# Patient Record
Sex: MALE | Race: WHITE | Employment: OTHER | URBAN - METROPOLITAN AREA
[De-identification: names, ages, dates, MRNs, and addresses within clinical notes are randomized per-mention and may not be internally consistent; named-entity substitution may affect disease eponyms.]

---

## 2022-05-23 ENCOUNTER — TELEPHONE (OUTPATIENT)
Dept: ENT CLINIC | Age: 82
End: 2022-05-23

## 2022-05-25 ENCOUNTER — TELEPHONE (OUTPATIENT)
Dept: ENT CLINIC | Age: 82
End: 2022-05-25

## 2022-05-25 NOTE — TELEPHONE ENCOUNTER
Attempted to call the pt to r/s their next appt due to Dr. Jaqueline Mendoza being in surgery. Unable to reach the pt so lvm stating that this appt was cancelled and to call the office to r/s the appt.

## 2023-02-28 ENCOUNTER — OFFICE VISIT (OUTPATIENT)
Dept: ENT CLINIC | Age: 83
End: 2023-02-28
Payer: MEDICARE

## 2023-02-28 VITALS
HEIGHT: 71 IN | RESPIRATION RATE: 16 BRPM | WEIGHT: 225 LBS | SYSTOLIC BLOOD PRESSURE: 134 MMHG | DIASTOLIC BLOOD PRESSURE: 82 MMHG | BODY MASS INDEX: 31.5 KG/M2 | HEART RATE: 95 BPM | OXYGEN SATURATION: 96 %

## 2023-02-28 DIAGNOSIS — J31.0 CHRONIC RHINITIS: ICD-10-CM

## 2023-02-28 DIAGNOSIS — H72.92 PERFORATION OF LEFT TYMPANIC MEMBRANE: ICD-10-CM

## 2023-02-28 DIAGNOSIS — H61.23 BILATERAL IMPACTED CERUMEN: ICD-10-CM

## 2023-02-28 DIAGNOSIS — H90.3 SENSORINEURAL HEARING LOSS (SNHL) OF BOTH EARS: Primary | ICD-10-CM

## 2023-02-28 PROCEDURE — 69210 REMOVE IMPACTED EAR WAX UNI: CPT | Performed by: OTOLARYNGOLOGY

## 2023-02-28 PROCEDURE — 1123F ACP DISCUSS/DSCN MKR DOCD: CPT | Performed by: OTOLARYNGOLOGY

## 2023-02-28 PROCEDURE — G8417 CALC BMI ABV UP PARAM F/U: HCPCS | Performed by: OTOLARYNGOLOGY

## 2023-02-28 PROCEDURE — 99203 OFFICE O/P NEW LOW 30 MIN: CPT | Performed by: OTOLARYNGOLOGY

## 2023-02-28 PROCEDURE — 1101F PT FALLS ASSESS-DOCD LE1/YR: CPT | Performed by: OTOLARYNGOLOGY

## 2023-02-28 PROCEDURE — G8427 DOCREV CUR MEDS BY ELIG CLIN: HCPCS | Performed by: OTOLARYNGOLOGY

## 2023-02-28 PROCEDURE — G8536 NO DOC ELDER MAL SCRN: HCPCS | Performed by: OTOLARYNGOLOGY

## 2023-02-28 PROCEDURE — G8510 SCR DEP NEG, NO PLAN REQD: HCPCS | Performed by: OTOLARYNGOLOGY

## 2023-02-28 RX ORDER — TAMSULOSIN HYDROCHLORIDE 0.4 MG/1
CAPSULE ORAL
COMMUNITY
Start: 2023-02-08

## 2023-02-28 RX ORDER — NEOMYCIN SULFATE, POLYMYXIN B SULFATE AND HYDROCORTISONE 10; 3.5; 1 MG/ML; MG/ML; [USP'U]/ML
3 SUSPENSION/ DROPS AURICULAR (OTIC) 4 TIMES DAILY
Qty: 10 ML | Refills: 1 | Status: SHIPPED | OUTPATIENT
Start: 2023-02-28

## 2023-02-28 RX ORDER — LOSARTAN POTASSIUM AND HYDROCHLOROTHIAZIDE 12.5; 1 MG/1; MG/1
TABLET ORAL
COMMUNITY
Start: 2023-02-08

## 2023-02-28 RX ORDER — ATORVASTATIN CALCIUM 20 MG/1
TABLET, FILM COATED ORAL
COMMUNITY
Start: 2023-02-08

## 2023-02-28 NOTE — LETTER
2/28/2023    Patient: Mustapha Garvin. YOB: 1940   Date of Visit: 2/28/2023     Jason Knight MD  [address] [place] [phone]  Via Fax: [phone]    Dear Jason Knight MD,      Thank you for referring Mr. Khadar Mayer to [facility] for evaluation. My notes for this consultation are attached. If you have questions, please do not hesitate to call me. I look forward to following your patient along with you.       Sincerely,    Mercy Andrews MD

## 2023-02-28 NOTE — PROGRESS NOTES
Subjective: Deborrhowie Amen.   80 y.o.   1940     New Patient Visit    History of Present Illness:    2/28/23  Pt seen in practice in past, was lost to followup - presents again for ear problems - known left TM perf, hx of tubes in past; also needs ears cleaned, hearing has been getting worse. Review of Systems  Review of Systems   Constitutional:  Negative for chills and fever. HENT:  Positive for congestion and hearing loss. Negative for ear pain, nosebleeds and tinnitus. Eyes:  Negative for blurred vision and double vision. Respiratory:  Negative for cough, sputum production and shortness of breath. Cardiovascular:  Negative for chest pain and palpitations. Gastrointestinal:  Negative for heartburn, nausea and vomiting. Musculoskeletal:  Negative for joint pain and neck pain. Skin: Negative. Neurological:  Negative for dizziness, speech change, weakness and headaches. Endo/Heme/Allergies:  Positive for environmental allergies. Does not bruise/bleed easily. Psychiatric/Behavioral:  Negative for memory loss. The patient does not have insomnia. Past Medical History:   Diagnosis Date    High cholesterol     HTN (hypertension)      Past Surgical History:   Procedure Laterality Date    HX AAA REPAIR      HX OTHER SURGICAL      open heart      History reviewed. No pertinent family history. Social History     Tobacco Use    Smoking status: Former     Types: Cigarettes    Smokeless tobacco: Never   Substance Use Topics    Alcohol use: Not on file      Prior to Admission medications    Medication Sig Start Date End Date Taking?  Authorizing Provider   atorvastatin (LIPITOR) 20 mg tablet  2/8/23  Yes Provider, Historical   losartan-hydroCHLOROthiazide (HYZAAR) 100-12.5 mg per tablet  2/8/23  Yes Provider, Historical   tamsulosin (FLOMAX) 0.4 mg capsule  2/8/23  Yes Provider, Historical        No Known Allergies      Objective:     Visit Vitals  /82 (BP 1 Location: Left upper arm, BP Patient Position: Sitting, BP Cuff Size: Adult)   Pulse 95   Resp 16   Ht 5' 11\" (1.803 m)   Wt 225 lb (102.1 kg)   SpO2 96%   BMI 31.38 kg/m²        Physical Exam  Vitals reviewed. Constitutional:       General: He is awake. Appearance: Normal appearance. He is normal weight. HENT:      Head: Normocephalic and atraumatic. Jaw: There is normal jaw occlusion. No trismus, tenderness or malocclusion. Salivary Glands: Right salivary gland is not diffusely enlarged or tender. Left salivary gland is not diffusely enlarged or tender. Right Ear: Tympanic membrane, ear canal and external ear normal. Decreased hearing noted. There is impacted cerumen. Left Ear: Ear canal and external ear normal. Decreased hearing noted. There is impacted cerumen. Tympanic membrane is perforated. Ears:        Comments: 40% ant/inf perforation left     Nose: No septal deviation, mucosal edema or rhinorrhea. Right Turbinates: Not enlarged, swollen or pale. Left Turbinates: Not enlarged, swollen or pale. Right Sinus: No maxillary sinus tenderness or frontal sinus tenderness. Left Sinus: No maxillary sinus tenderness or frontal sinus tenderness. Mouth/Throat:      Lips: Pink. Mouth: Mucous membranes are moist. No oral lesions. Dentition: Normal dentition. No gum lesions. Tongue: No lesions. Palate: No mass and lesions. Pharynx: Oropharynx is clear. Uvula midline. Tonsils: No tonsillar exudate. 0 on the right. 0 on the left. Eyes:      General: Vision grossly intact. Extraocular Movements: Extraocular movements intact. Right eye: No nystagmus. Left eye: No nystagmus. Pupils: Pupils are equal, round, and reactive to light. Neck:      Thyroid: No thyroid mass, thyromegaly or thyroid tenderness. Trachea: Trachea and phonation normal. No tracheal tenderness. Cardiovascular:      Rate and Rhythm: Normal rate and regular rhythm. Pulmonary:      Effort: Pulmonary effort is normal.      Breath sounds: Normal breath sounds. No stridor. No wheezing. Musculoskeletal:         General: Normal range of motion. Cervical back: Normal range of motion. No edema or erythema. Lymphadenopathy:      Cervical: No cervical adenopathy. Skin:     General: Skin is warm and dry. Neurological:      General: No focal deficit present. Mental Status: He is alert and oriented to person, place, and time. Mental status is at baseline. Coordination: Romberg sign negative. Gait: Gait is intact. Psychiatric:         Mood and Affect: Mood normal.         Behavior: Behavior normal. Behavior is cooperative. Procedure - Removal Impacted Cerumen    Indications: cerumen impaction    Ears are examined under microscope/headlight.  bilateral ears are cleaned using otologic curette, suction, and/or alligator forceps. Assessment/Plan:     Encounter Diagnoses   Name Primary? Sensorineural hearing loss (SNHL) of both ears Yes    Perforation of left tympanic membrane     Chronic rhinitis     Bilateral impacted cerumen      Ears cleaned AU  Right TM retracted  Left TM dry perforation  Will refill neomycin gtts only use if otorrhea  Fu 6 mos for cleaning    Orders Placed This Encounter    atorvastatin (LIPITOR) 20 mg tablet    losartan-hydroCHLOROthiazide (HYZAAR) 100-12.5 mg per tablet    tamsulosin (FLOMAX) 0.4 mg capsule           Thank you for referring this patient,    Nestor Angela MD, [address] [facility]    [address]  [facility]    [address] [address]  [place], [address] [place] [address]

## 2023-05-25 RX ORDER — TAMSULOSIN HYDROCHLORIDE 0.4 MG/1
CAPSULE ORAL
COMMUNITY
Start: 2023-02-08

## 2023-05-25 RX ORDER — ATORVASTATIN CALCIUM 20 MG/1
TABLET, FILM COATED ORAL
COMMUNITY
Start: 2023-02-08

## 2023-05-25 RX ORDER — LOSARTAN POTASSIUM AND HYDROCHLOROTHIAZIDE 12.5; 1 MG/1; MG/1
TABLET ORAL
COMMUNITY
Start: 2023-02-08

## 2023-05-25 RX ORDER — NEOMYCIN SULFATE, POLYMYXIN B SULFATE AND HYDROCORTISONE 10; 3.5; 1 MG/ML; MG/ML; [USP'U]/ML
3 SUSPENSION/ DROPS AURICULAR (OTIC) 4 TIMES DAILY
COMMUNITY
Start: 2023-02-28

## 2023-10-10 ENCOUNTER — OFFICE VISIT (OUTPATIENT)
Age: 83
End: 2023-10-10
Payer: MEDICARE

## 2023-10-10 VITALS
OXYGEN SATURATION: 95 % | DIASTOLIC BLOOD PRESSURE: 74 MMHG | HEART RATE: 114 BPM | BODY MASS INDEX: 32.62 KG/M2 | HEIGHT: 71 IN | SYSTOLIC BLOOD PRESSURE: 136 MMHG | RESPIRATION RATE: 16 BRPM | WEIGHT: 233 LBS

## 2023-10-10 DIAGNOSIS — H73.891 RETRACTED TYMPANIC MEMBRANE, RIGHT: ICD-10-CM

## 2023-10-10 DIAGNOSIS — H91.93 DECREASED HEARING, BILATERAL: ICD-10-CM

## 2023-10-10 DIAGNOSIS — H69.83 ETD (EUSTACHIAN TUBE DYSFUNCTION), BILATERAL: ICD-10-CM

## 2023-10-10 DIAGNOSIS — H72.92 PERFORATION OF LEFT TYMPANIC MEMBRANE: Primary | ICD-10-CM

## 2023-10-10 PROCEDURE — G8484 FLU IMMUNIZE NO ADMIN: HCPCS | Performed by: OTOLARYNGOLOGY

## 2023-10-10 PROCEDURE — 1036F TOBACCO NON-USER: CPT | Performed by: OTOLARYNGOLOGY

## 2023-10-10 PROCEDURE — 99213 OFFICE O/P EST LOW 20 MIN: CPT | Performed by: OTOLARYNGOLOGY

## 2023-10-10 PROCEDURE — G8427 DOCREV CUR MEDS BY ELIG CLIN: HCPCS | Performed by: OTOLARYNGOLOGY

## 2023-10-10 PROCEDURE — 1123F ACP DISCUSS/DSCN MKR DOCD: CPT | Performed by: OTOLARYNGOLOGY

## 2023-10-10 PROCEDURE — G8419 CALC BMI OUT NRM PARAM NOF/U: HCPCS | Performed by: OTOLARYNGOLOGY

## 2023-10-10 RX ORDER — ALBUTEROL SULFATE 90 UG/1
AEROSOL, METERED RESPIRATORY (INHALATION)
COMMUNITY
Start: 2023-08-02

## 2025-01-16 ENCOUNTER — HOSPITAL ENCOUNTER (OUTPATIENT)
Facility: HOSPITAL | Age: 85
Discharge: HOME | End: 2025-01-19
Payer: MEDICARE

## 2025-01-16 ENCOUNTER — TRANSCRIBE ORDERS (OUTPATIENT)
Facility: HOSPITAL | Age: 85
End: 2025-01-16

## 2025-01-16 DIAGNOSIS — R05.9 COUGH, UNSPECIFIED TYPE: Primary | ICD-10-CM

## 2025-01-16 DIAGNOSIS — R05.9 COUGH, UNSPECIFIED TYPE: ICD-10-CM

## 2025-01-16 PROCEDURE — 71046 X-RAY EXAM CHEST 2 VIEWS: CPT

## 2025-02-14 ENCOUNTER — TRANSCRIBE ORDERS (OUTPATIENT)
Facility: HOSPITAL | Age: 85
End: 2025-02-14

## 2025-02-14 DIAGNOSIS — G89.29 CHRONIC LOW BACK PAIN, UNSPECIFIED BACK PAIN LATERALITY, UNSPECIFIED WHETHER SCIATICA PRESENT: Primary | ICD-10-CM

## 2025-02-14 DIAGNOSIS — M54.50 CHRONIC LOW BACK PAIN, UNSPECIFIED BACK PAIN LATERALITY, UNSPECIFIED WHETHER SCIATICA PRESENT: Primary | ICD-10-CM

## 2025-03-18 ENCOUNTER — HOSPITAL ENCOUNTER (INPATIENT)
Facility: HOSPITAL | Age: 85
LOS: 5 days | Discharge: HOME HEALTH | DRG: 291 | End: 2025-03-23
Attending: EMERGENCY MEDICINE
Payer: MEDICARE

## 2025-03-18 ENCOUNTER — APPOINTMENT (OUTPATIENT)
Facility: HOSPITAL | Age: 85
DRG: 291 | End: 2025-03-18
Payer: MEDICARE

## 2025-03-18 DIAGNOSIS — G89.29 OTHER CHRONIC PAIN: ICD-10-CM

## 2025-03-18 DIAGNOSIS — I50.9 CONGESTIVE HEART FAILURE, UNSPECIFIED HF CHRONICITY, UNSPECIFIED HEART FAILURE TYPE (HCC): ICD-10-CM

## 2025-03-18 DIAGNOSIS — D64.9 ANEMIA, UNSPECIFIED TYPE: Primary | ICD-10-CM

## 2025-03-18 PROBLEM — I50.43 CHF (CONGESTIVE HEART FAILURE), NYHA CLASS I, ACUTE ON CHRONIC, COMBINED (HCC): Status: ACTIVE | Noted: 2025-03-18

## 2025-03-18 LAB
ALBUMIN SERPL-MCNC: 3.3 G/DL (ref 3.5–5)
ALBUMIN/GLOB SERPL: 1.4 (ref 1.1–2.2)
ALP SERPL-CCNC: 84 U/L (ref 45–117)
ALT SERPL-CCNC: 23 U/L (ref 12–78)
ANION GAP SERPL CALC-SCNC: 6 MMOL/L (ref 2–12)
AST SERPL W P-5'-P-CCNC: 20 U/L (ref 15–37)
BASOPHILS # BLD: 0.02 K/UL (ref 0–0.1)
BASOPHILS NFR BLD: 0.4 % (ref 0–1)
BILIRUB SERPL-MCNC: 0.4 MG/DL (ref 0.2–1)
BNP SERPL-MCNC: 1366 PG/ML
BUN SERPL-MCNC: 14 MG/DL (ref 6–20)
BUN/CREAT SERPL: 15 (ref 12–20)
CA-I BLD-MCNC: 8.6 MG/DL (ref 8.5–10.1)
CHLORIDE SERPL-SCNC: 91 MMOL/L (ref 97–108)
CO2 SERPL-SCNC: 26 MMOL/L (ref 21–32)
CREAT SERPL-MCNC: 0.96 MG/DL (ref 0.7–1.3)
DIFFERENTIAL METHOD BLD: ABNORMAL
EOSINOPHIL # BLD: 0.04 K/UL (ref 0–0.4)
EOSINOPHIL NFR BLD: 0.8 % (ref 0–7)
ERYTHROCYTE [DISTWIDTH] IN BLOOD BY AUTOMATED COUNT: 17.6 % (ref 11.5–14.5)
FLUAV RNA SPEC QL NAA+PROBE: NOT DETECTED
FLUBV RNA SPEC QL NAA+PROBE: NOT DETECTED
GLOBULIN SER CALC-MCNC: 2.4 G/DL (ref 2–4)
GLUCOSE BLD STRIP.AUTO-MCNC: 105 MG/DL (ref 65–100)
GLUCOSE SERPL-MCNC: 98 MG/DL (ref 65–100)
HCT VFR BLD AUTO: 16.1 % (ref 36.6–50.3)
HGB BLD-MCNC: 4.6 G/DL (ref 12.1–17)
IMM GRANULOCYTES # BLD AUTO: 0.02 K/UL (ref 0–0.04)
IMM GRANULOCYTES NFR BLD AUTO: 0.4 % (ref 0–0.5)
LYMPHOCYTES # BLD: 0.79 K/UL (ref 0.8–3.5)
LYMPHOCYTES NFR BLD: 15.5 % (ref 12–49)
MAGNESIUM SERPL-MCNC: 1.9 MG/DL (ref 1.6–2.4)
MCH RBC QN AUTO: 19.5 PG (ref 26–34)
MCHC RBC AUTO-ENTMCNC: 28.6 G/DL (ref 30–36.5)
MCV RBC AUTO: 68.2 FL (ref 80–99)
MONOCYTES # BLD: 0.69 K/UL (ref 0–1)
MONOCYTES NFR BLD: 13.6 % (ref 5–13)
NEUTS SEG # BLD: 3.53 K/UL (ref 1.8–8)
NEUTS SEG NFR BLD: 69.3 % (ref 32–75)
NRBC # BLD: 0 K/UL (ref 0–0.01)
NRBC BLD-RTO: 0 PER 100 WBC
PERFORMED BY:: ABNORMAL
PLATELET # BLD AUTO: 248 K/UL (ref 150–400)
PMV BLD AUTO: 9.5 FL (ref 8.9–12.9)
POTASSIUM SERPL-SCNC: 4.3 MMOL/L (ref 3.5–5.1)
PROT SERPL-MCNC: 5.7 G/DL (ref 6.4–8.2)
RBC # BLD AUTO: 2.36 M/UL (ref 4.1–5.7)
RBC MORPH BLD: ABNORMAL
SARS-COV-2 RNA RESP QL NAA+PROBE: NOT DETECTED
SODIUM SERPL-SCNC: 123 MMOL/L (ref 136–145)
TROPONIN I SERPL HS-MCNC: 18 NG/L (ref 0–76)
WBC # BLD AUTO: 5.1 K/UL (ref 4.1–11.1)

## 2025-03-18 PROCEDURE — P9016 RBC LEUKOCYTES REDUCED: HCPCS

## 2025-03-18 PROCEDURE — 6370000000 HC RX 637 (ALT 250 FOR IP): Performed by: EMERGENCY MEDICINE

## 2025-03-18 PROCEDURE — 82962 GLUCOSE BLOOD TEST: CPT

## 2025-03-18 PROCEDURE — 84133 ASSAY OF URINE POTASSIUM: CPT

## 2025-03-18 PROCEDURE — 2060000000 HC ICU INTERMEDIATE R&B

## 2025-03-18 PROCEDURE — 51798 US URINE CAPACITY MEASURE: CPT

## 2025-03-18 PROCEDURE — 2700000000 HC OXYGEN THERAPY PER DAY

## 2025-03-18 PROCEDURE — 85025 COMPLETE CBC W/AUTO DIFF WBC: CPT

## 2025-03-18 PROCEDURE — 6360000002 HC RX W HCPCS: Performed by: EMERGENCY MEDICINE

## 2025-03-18 PROCEDURE — 86923 COMPATIBILITY TEST ELECTRIC: CPT

## 2025-03-18 PROCEDURE — 93005 ELECTROCARDIOGRAM TRACING: CPT

## 2025-03-18 PROCEDURE — 6370000000 HC RX 637 (ALT 250 FOR IP)

## 2025-03-18 PROCEDURE — 83735 ASSAY OF MAGNESIUM: CPT

## 2025-03-18 PROCEDURE — 80053 COMPREHEN METABOLIC PANEL: CPT

## 2025-03-18 PROCEDURE — 86900 BLOOD TYPING SEROLOGIC ABO: CPT

## 2025-03-18 PROCEDURE — 84145 PROCALCITONIN (PCT): CPT

## 2025-03-18 PROCEDURE — 84300 ASSAY OF URINE SODIUM: CPT

## 2025-03-18 PROCEDURE — 51701 INSERT BLADDER CATHETER: CPT

## 2025-03-18 PROCEDURE — 99285 EMERGENCY DEPT VISIT HI MDM: CPT

## 2025-03-18 PROCEDURE — 84484 ASSAY OF TROPONIN QUANT: CPT

## 2025-03-18 PROCEDURE — 83935 ASSAY OF URINE OSMOLALITY: CPT

## 2025-03-18 PROCEDURE — 86850 RBC ANTIBODY SCREEN: CPT

## 2025-03-18 PROCEDURE — 86901 BLOOD TYPING SEROLOGIC RH(D): CPT

## 2025-03-18 PROCEDURE — 83880 ASSAY OF NATRIURETIC PEPTIDE: CPT

## 2025-03-18 PROCEDURE — 71045 X-RAY EXAM CHEST 1 VIEW: CPT

## 2025-03-18 PROCEDURE — 36415 COLL VENOUS BLD VENIPUNCTURE: CPT

## 2025-03-18 PROCEDURE — 6360000002 HC RX W HCPCS

## 2025-03-18 PROCEDURE — 87636 SARSCOV2 & INF A&B AMP PRB: CPT

## 2025-03-18 PROCEDURE — 82436 ASSAY OF URINE CHLORIDE: CPT

## 2025-03-18 RX ORDER — TAMSULOSIN HYDROCHLORIDE 0.4 MG/1
0.4 CAPSULE ORAL DAILY
Status: DISCONTINUED | OUTPATIENT
Start: 2025-03-18 | End: 2025-03-23 | Stop reason: HOSPADM

## 2025-03-18 RX ORDER — SODIUM CHLORIDE 9 MG/ML
INJECTION, SOLUTION INTRAVENOUS PRN
Status: DISCONTINUED | OUTPATIENT
Start: 2025-03-18 | End: 2025-03-23 | Stop reason: HOSPADM

## 2025-03-18 RX ORDER — PREGABALIN 25 MG/1
25 CAPSULE ORAL 2 TIMES DAILY
COMMUNITY

## 2025-03-18 RX ORDER — FUROSEMIDE 10 MG/ML
40 INJECTION INTRAMUSCULAR; INTRAVENOUS 2 TIMES DAILY
Status: DISCONTINUED | OUTPATIENT
Start: 2025-03-18 | End: 2025-03-23 | Stop reason: HOSPADM

## 2025-03-18 RX ORDER — PREGABALIN 25 MG/1
25 CAPSULE ORAL 2 TIMES DAILY
Status: DISCONTINUED | OUTPATIENT
Start: 2025-03-19 | End: 2025-03-23 | Stop reason: HOSPADM

## 2025-03-18 RX ORDER — IPRATROPIUM BROMIDE AND ALBUTEROL SULFATE 2.5; .5 MG/3ML; MG/3ML
1 SOLUTION RESPIRATORY (INHALATION)
Status: DISCONTINUED | OUTPATIENT
Start: 2025-03-18 | End: 2025-03-19

## 2025-03-18 RX ORDER — INSULIN LISPRO 100 [IU]/ML
0-4 INJECTION, SOLUTION INTRAVENOUS; SUBCUTANEOUS
Status: DISCONTINUED | OUTPATIENT
Start: 2025-03-18 | End: 2025-03-23 | Stop reason: HOSPADM

## 2025-03-18 RX ORDER — ATORVASTATIN CALCIUM 20 MG/1
20 TABLET, FILM COATED ORAL DAILY
Status: DISCONTINUED | OUTPATIENT
Start: 2025-03-19 | End: 2025-03-23 | Stop reason: HOSPADM

## 2025-03-18 RX ORDER — GLUCAGON 1 MG/ML
1 KIT INJECTION PRN
Status: DISCONTINUED | OUTPATIENT
Start: 2025-03-18 | End: 2025-03-23 | Stop reason: HOSPADM

## 2025-03-18 RX ORDER — FUROSEMIDE 10 MG/ML
40 INJECTION INTRAMUSCULAR; INTRAVENOUS
Status: COMPLETED | OUTPATIENT
Start: 2025-03-18 | End: 2025-03-18

## 2025-03-18 RX ORDER — LIDOCAINE 50 MG/G
1 PATCH TOPICAL DAILY
COMMUNITY

## 2025-03-18 RX ORDER — ALPRAZOLAM 0.5 MG
0.25 TABLET ORAL NIGHTLY PRN
Status: DISCONTINUED | OUTPATIENT
Start: 2025-03-18 | End: 2025-03-23 | Stop reason: HOSPADM

## 2025-03-18 RX ORDER — DEXTROSE MONOHYDRATE 100 MG/ML
INJECTION, SOLUTION INTRAVENOUS CONTINUOUS PRN
Status: DISCONTINUED | OUTPATIENT
Start: 2025-03-18 | End: 2025-03-23 | Stop reason: HOSPADM

## 2025-03-18 RX ORDER — LIDOCAINE 4 G/G
1 PATCH TOPICAL DAILY
Status: DISCONTINUED | OUTPATIENT
Start: 2025-03-19 | End: 2025-03-23 | Stop reason: HOSPADM

## 2025-03-18 RX ORDER — METHYLPREDNISOLONE SODIUM SUCCINATE 40 MG/ML
40 INJECTION INTRAMUSCULAR; INTRAVENOUS EVERY 12 HOURS
Status: DISCONTINUED | OUTPATIENT
Start: 2025-03-18 | End: 2025-03-20

## 2025-03-18 RX ORDER — ALPRAZOLAM 0.25 MG
0.25 TABLET ORAL NIGHTLY PRN
COMMUNITY

## 2025-03-18 RX ORDER — OXYCODONE AND ACETAMINOPHEN 5; 325 MG/1; MG/1
1 TABLET ORAL EVERY 6 HOURS PRN
Refills: 0 | Status: DISCONTINUED | OUTPATIENT
Start: 2025-03-18 | End: 2025-03-23 | Stop reason: HOSPADM

## 2025-03-18 RX ORDER — DEXTROSE MONOHYDRATE 100 MG/ML
INJECTION, SOLUTION INTRAVENOUS CONTINUOUS PRN
Status: DISCONTINUED | OUTPATIENT
Start: 2025-03-18 | End: 2025-03-18 | Stop reason: SDUPTHER

## 2025-03-18 RX ORDER — GLUCAGON 1 MG/ML
1 KIT INJECTION PRN
Status: DISCONTINUED | OUTPATIENT
Start: 2025-03-18 | End: 2025-03-18 | Stop reason: SDUPTHER

## 2025-03-18 RX ORDER — PANTOPRAZOLE SODIUM 40 MG/10ML
40 INJECTION, POWDER, LYOPHILIZED, FOR SOLUTION INTRAVENOUS 2 TIMES DAILY
Status: DISCONTINUED | OUTPATIENT
Start: 2025-03-18 | End: 2025-03-19

## 2025-03-18 RX ORDER — ACETAMINOPHEN 325 MG/1
650 TABLET ORAL
Status: COMPLETED | OUTPATIENT
Start: 2025-03-18 | End: 2025-03-18

## 2025-03-18 RX ADMIN — TAMSULOSIN HYDROCHLORIDE 0.4 MG: 0.4 CAPSULE ORAL at 23:51

## 2025-03-18 RX ADMIN — PANTOPRAZOLE SODIUM 40 MG: 40 INJECTION, POWDER, FOR SOLUTION INTRAVENOUS at 23:39

## 2025-03-18 RX ADMIN — METHYLPREDNISOLONE SODIUM SUCCINATE 40 MG: 40 INJECTION INTRAMUSCULAR; INTRAVENOUS at 23:47

## 2025-03-18 RX ADMIN — ACETAMINOPHEN 650 MG: 325 TABLET ORAL at 20:04

## 2025-03-18 RX ADMIN — FUROSEMIDE 40 MG: 10 INJECTION, SOLUTION INTRAMUSCULAR; INTRAVENOUS at 20:03

## 2025-03-18 ASSESSMENT — LIFESTYLE VARIABLES
HOW OFTEN DO YOU HAVE A DRINK CONTAINING ALCOHOL: NEVER
HOW MANY STANDARD DRINKS CONTAINING ALCOHOL DO YOU HAVE ON A TYPICAL DAY: PATIENT DOES NOT DRINK

## 2025-03-18 ASSESSMENT — PAIN DESCRIPTION - DESCRIPTORS
DESCRIPTORS: ACHING
DESCRIPTORS: ACHING

## 2025-03-18 ASSESSMENT — PAIN DESCRIPTION - LOCATION
LOCATION: BACK
LOCATION: BACK

## 2025-03-18 ASSESSMENT — PAIN SCALES - GENERAL
PAINLEVEL_OUTOF10: 8
PAINLEVEL_OUTOF10: 8
PAINLEVEL_OUTOF10: 0

## 2025-03-18 ASSESSMENT — PAIN DESCRIPTION - PAIN TYPE
TYPE: CHRONIC PAIN
TYPE: CHRONIC PAIN

## 2025-03-18 NOTE — ED PROVIDER NOTES
[facility] EMERGENCY DEPT  EMERGENCY DEPARTMENT HISTORY AND PHYSICAL EXAM      Date: 3/18/2025  Patient Name: Van Cassidy Jr.  MRN: [id number]  Birthdate 1940  Date of evaluation: 3/18/2025  Provider: Jean-Pierre Boss MD   Note Started: 4:28 PM EDT 3/18/25    HISTORY OF PRESENT ILLNESS     Chief Complaint   Patient presents with    Shortness of Breath       History Provided By: Patient    HPI: Van Cassidy Jr. is a 84 y.o. male with no past medical history significant for hypertension as well as hypercholesterolemia who presents to the emergency room with increased bilateral lower extremity edema and swelling with extension into his abdomen and shortness of breath.  Seen evaluated by his PCP Dr. Medrano at outside facility subsequent sent to the emergency room for further evaluation.  No prior diagnosis of congestive heart failure not on any diuretics at this point in time.    Patient specifically denies fever, chills, nausea, vomiting, chest pain, shortness of breath, rash, diarrhea, headache minutes with complaints.    PAST MEDICAL HISTORY   Past Medical History:  Past Medical History:   Diagnosis Date    High cholesterol     HTN (hypertension)        Past Surgical History:  Past Surgical History:   Procedure Laterality Date    ABDOMINAL AORTIC ANEURYSM REPAIR      OTHER SURGICAL HISTORY      open heart       Family History:  No family history on file.    Social History:  Social History     Tobacco Use    Smoking status: Former    Smokeless tobacco: Never       Allergies:  Allergies   Allergen Reactions    Codeine Hallucinations       PCP: Ray Salazar MD    Current Meds:   Current Facility-Administered Medications   Medication Dose Route Frequency Provider Last Rate Last Admin    predniSONE (DELTASONE) tablet 20 mg  20 mg Oral BID Antonio Bernal MD        urea (URE-NA) packet 15 g  15 g Oral BID Sebas Gomez MD   15 g at 03/21/25 0849    pantoprazole (PROTONIX) injection 40 mg  40 mg IntraVENous

## 2025-03-18 NOTE — CONSENT
Informed Consent for Blood Component Transfusion Note    I have discussed with the patient the rationale for blood component transfusion; its benefits in treating or preventing fatigue, organ damage, or death; and its risk which includes mild transfusion reactions, rare risk of blood borne infection, or more serious but rare reactions. I have discussed the alternatives to transfusion, including the risk and consequences of not receiving transfusion. The patient had an opportunity to ask questions and had agreed to proceed with transfusion of blood components.    Electronically signed by Jean-Pierre Boss MD on 3/18/25 at 5:48 PM EDT

## 2025-03-18 NOTE — NURSE NAVIGATOR
Heart Failure Education/Evaluation/Recommendations      ** please call spouse if not at bedside.      ER visit        Code Status: Full Code        CXRAY: Not Ordered        BNP: Not Ordered  DATE   RESULTS    HGA1C:Not Ordered  DATE   RESULTS      ECHO:Not Ordered        Cardiology Consulted: Not Ordered ( [facility] sent patient in)        Important History: Ex-smoker quit 20years ago, Abdominal Aneuysm in 2008 with stent, MI with CABG in 2008, HTN, Hypercholesteremia, Bulging lumbar disc, deaf in left ear, heaing impairment/diminished in right hear            ------------------------------------------------------------------------------------    RN-NN knocks, enters the room, and performs hand hygiene/ uses proper PPE. RNMAMTA introduces herself and the reason for visit.    Education packet brought to the patients room. (Magnet, AHA heart failure book, AHA HF Zone calendar, AHA Take charge of Heart Failure Discussion Guide, Where's the sodium Nutrition Label, What is a Fluid Fluid Restriction, Daily weight knowledge, BP/WT Chart, Fluid/Sodium Restriction Chart)    Patient & family agrees to Education.    Patient has a scale that works.    Patient/family/care giver educated by RNBurakNN, Dietitian, COPD educator, Doctor, and/or Nurse. Education includes all the following even if all does not apply to the patient at this time.    Low sodium Diet, reading Nutrition labels, and foods to avoid  Fluid Restriction  Smoking Cessation, ETOH abuse cessation, Drug use/abuse cessation  Daily Weights  Symptoms and Reporting symptoms to Cardiology/Nephrology  Activity/Exercise  Support Groups and family support  Local Resources    RNBurakNN defers ONGOING Education to Nursing Staff.    Social Determinants of Health problems issues:  Financial:   Denied  Transportation:  Denied  Education:   Denied  Food:    Denied  Housing:   Denied  Safety:    Denied  Drug/ETOH/Smoking:  Denied (history of smoking)        Resources offered via [facility]

## 2025-03-18 NOTE — ED TRIAGE NOTES
Pt arrives via EMS with complaints of sob, from [facility] in [place]. Provider sent due to CHF exacerbation, diagnosed today.     Per patient he states he has been sob for a few months but it has gotten worse the past few weeks

## 2025-03-19 ENCOUNTER — APPOINTMENT (OUTPATIENT)
Facility: HOSPITAL | Age: 85
DRG: 291 | End: 2025-03-19
Attending: INTERNAL MEDICINE
Payer: MEDICARE

## 2025-03-19 ENCOUNTER — APPOINTMENT (OUTPATIENT)
Facility: HOSPITAL | Age: 85
DRG: 291 | End: 2025-03-19
Payer: MEDICARE

## 2025-03-19 LAB
BASOPHILS # BLD: 0 K/UL (ref 0–0.1)
BASOPHILS NFR BLD: 0 % (ref 0–1)
CHLORIDE UR-SCNC: 84 MMOL/L
CHOLEST SERPL-MCNC: 76 MG/DL
DIFFERENTIAL METHOD BLD: ABNORMAL
ECHO AO ASC DIAM: 3.6 CM
ECHO AO ASCENDING AORTA INDEX: 1.61 CM/M2
ECHO AO ROOT DIAM: 4.1 CM
ECHO AO ROOT INDEX: 1.84 CM/M2
ECHO AR MAX VEL PISA: 2.6 M/S
ECHO AV AREA PEAK VELOCITY: 2.9 CM2
ECHO AV AREA VTI: 3.2 CM2
ECHO AV AREA/BSA PEAK VELOCITY: 1.3 CM2/M2
ECHO AV AREA/BSA VTI: 1.4 CM2/M2
ECHO AV MEAN GRADIENT: 5 MMHG
ECHO AV MEAN VELOCITY: 1.1 M/S
ECHO AV PEAK GRADIENT: 8 MMHG
ECHO AV PEAK VELOCITY: 1.4 M/S
ECHO AV REGURGITANT PHT: 314 MS
ECHO AV VELOCITY RATIO: 0.79
ECHO AV VTI: 25.4 CM
ECHO BSA: 2.23 M2
ECHO EST RA PRESSURE: 3 MMHG
ECHO LA AREA 2C: 22.5 CM2
ECHO LA AREA 4C: 25.9 CM2
ECHO LA DIAMETER INDEX: 2.2 CM/M2
ECHO LA DIAMETER: 4.9 CM
ECHO LA MAJOR AXIS: 6.1 CM
ECHO LA MINOR AXIS: 5.7 CM
ECHO LA TO AORTIC ROOT RATIO: 1.2
ECHO LA VOL BP: 81 ML (ref 18–58)
ECHO LA VOL MOD A2C: 71 ML (ref 18–58)
ECHO LA VOL MOD A4C: 89 ML (ref 18–58)
ECHO LA VOL/BSA BIPLANE: 36 ML/M2 (ref 16–34)
ECHO LA VOLUME INDEX MOD A2C: 32 ML/M2 (ref 16–34)
ECHO LA VOLUME INDEX MOD A4C: 40 ML/M2 (ref 16–34)
ECHO LV E' LATERAL VELOCITY: 18.2 CM/S
ECHO LV E' SEPTAL VELOCITY: 9.36 CM/S
ECHO LV EDV A2C: 112 ML
ECHO LV EDV A4C: 196 ML
ECHO LV EDV INDEX A4C: 88 ML/M2
ECHO LV EDV NDEX A2C: 50 ML/M2
ECHO LV EF PHYSICIAN: 60 %
ECHO LV EJECTION FRACTION A2C: 60 %
ECHO LV EJECTION FRACTION A4C: 66 %
ECHO LV EJECTION FRACTION BIPLANE: 63 % (ref 55–100)
ECHO LV ESV A2C: 45 ML
ECHO LV ESV A4C: 67 ML
ECHO LV ESV INDEX A2C: 20 ML/M2
ECHO LV ESV INDEX A4C: 30 ML/M2
ECHO LV FRACTIONAL SHORTENING: 29 % (ref 28–44)
ECHO LV INTERNAL DIMENSION DIASTOLE INDEX: 2.33 CM/M2
ECHO LV INTERNAL DIMENSION DIASTOLIC: 5.2 CM (ref 4.2–5.9)
ECHO LV INTERNAL DIMENSION SYSTOLIC INDEX: 1.66 CM/M2
ECHO LV INTERNAL DIMENSION SYSTOLIC: 3.7 CM
ECHO LV IVSD: 1.4 CM (ref 0.6–1)
ECHO LV MASS 2D: 309.6 G (ref 88–224)
ECHO LV MASS INDEX 2D: 138.8 G/M2 (ref 49–115)
ECHO LV POSTERIOR WALL DIASTOLIC: 1.4 CM (ref 0.6–1)
ECHO LV RELATIVE WALL THICKNESS RATIO: 0.54
ECHO LVOT AREA: 3.8 CM2
ECHO LVOT AV VTI INDEX: 0.85
ECHO LVOT DIAM: 2.2 CM
ECHO LVOT MEAN GRADIENT: 3 MMHG
ECHO LVOT PEAK GRADIENT: 5 MMHG
ECHO LVOT PEAK VELOCITY: 1.1 M/S
ECHO LVOT STROKE VOLUME INDEX: 36.6 ML/M2
ECHO LVOT SV: 81.7 ML
ECHO LVOT VTI: 21.5 CM
ECHO MV A VELOCITY: 1.34 M/S
ECHO MV AREA VTI: 3.5 CM2
ECHO MV E DECELERATION TIME (DT): 125 MS
ECHO MV E VELOCITY: 1.24 M/S
ECHO MV E/A RATIO: 0.93
ECHO MV E/E' LATERAL: 6.81
ECHO MV E/E' RATIO (AVERAGED): 10.03
ECHO MV E/E' SEPTAL: 13.25
ECHO MV LVOT VTI INDEX: 1.09
ECHO MV MAX VELOCITY: 1.4 M/S
ECHO MV MEAN GRADIENT: 6 MMHG
ECHO MV MEAN VELOCITY: 1.2 M/S
ECHO MV PEAK GRADIENT: 8 MMHG
ECHO MV REGURGITANT ALIASING (NYQUIST) VELOCITY: 72 CM/S
ECHO MV REGURGITANT PEAK GRADIENT: 121 MMHG
ECHO MV REGURGITANT PEAK VELOCITY: 5.5 M/S
ECHO MV REGURGITANT RADIUS PISA: 0.9 CM
ECHO MV REGURGITANT VTIA: 158 CM
ECHO MV VTI: 23.5 CM
ECHO PULMONARY ARTERY END DIASTOLIC PRESSURE: 5 MMHG
ECHO PV MAX VELOCITY: 0.9 M/S
ECHO PV MEAN GRADIENT: 2 MMHG
ECHO PV MEAN VELOCITY: 0.7 M/S
ECHO PV PEAK GRADIENT: 3 MMHG
ECHO PV REGURGITANT MAX VELOCITY: 1.2 M/S
ECHO PV VTI: 16.5 CM
ECHO RA AREA 4C: 19.5 CM2
ECHO RA END SYSTOLIC VOLUME APICAL 4 CHAMBER INDEX BSA: 25 ML/M2
ECHO RA VOLUME: 55 ML
ECHO RIGHT VENTRICULAR SYSTOLIC PRESSURE (RVSP): 17 MMHG
ECHO RV BASAL DIMENSION: 4.6 CM
ECHO RV FREE WALL PEAK S': 14.9 CM/S
ECHO RV TAPSE: 2.4 CM (ref 1.7–?)
ECHO TV REGURGITANT MAX VELOCITY: 1.88 M/S
ECHO TV REGURGITANT PEAK GRADIENT: 14 MMHG
EOSINOPHIL # BLD: 0 K/UL (ref 0–0.4)
EOSINOPHIL NFR BLD: 0 % (ref 0–7)
ERYTHROCYTE [DISTWIDTH] IN BLOOD BY AUTOMATED COUNT: 21.2 % (ref 11.5–14.5)
EST. AVERAGE GLUCOSE BLD GHB EST-MCNC: ABNORMAL MG/DL
FERRITIN SERPL-MCNC: 15 NG/ML (ref 26–388)
FOLATE SERPL-MCNC: 33 NG/ML (ref 5–21)
GLUCOSE BLD STRIP.AUTO-MCNC: 147 MG/DL (ref 65–100)
GLUCOSE BLD STRIP.AUTO-MCNC: 156 MG/DL (ref 65–100)
GLUCOSE BLD STRIP.AUTO-MCNC: 159 MG/DL (ref 65–100)
GLUCOSE BLD STRIP.AUTO-MCNC: 171 MG/DL (ref 65–100)
HBA1C MFR BLD: <3.8 % (ref 4–5.6)
HCT VFR BLD AUTO: 23.1 % (ref 36.6–50.3)
HDLC SERPL-MCNC: 47 MG/DL
HDLC SERPL: 1.6 (ref 0–5)
HGB BLD-MCNC: 6.9 G/DL (ref 12.1–17)
IMM GRANULOCYTES # BLD AUTO: 0.05 K/UL (ref 0–0.04)
IMM GRANULOCYTES NFR BLD AUTO: 1.4 % (ref 0–0.5)
IRON SATN MFR SERPL: 19 % (ref 20–50)
IRON SERPL-MCNC: 80 UG/DL (ref 35–150)
LDLC SERPL CALC-MCNC: 22.2 MG/DL (ref 0–100)
LIPID PANEL: NORMAL
LYMPHOCYTES # BLD: 0.14 K/UL (ref 0.8–3.5)
LYMPHOCYTES NFR BLD: 4 % (ref 12–49)
MAGNESIUM SERPL-MCNC: 1.9 MG/DL (ref 1.6–2.4)
MCH RBC QN AUTO: 22.2 PG (ref 26–34)
MCHC RBC AUTO-ENTMCNC: 29.9 G/DL (ref 30–36.5)
MCV RBC AUTO: 74.3 FL (ref 80–99)
MONOCYTES # BLD: 0.04 K/UL (ref 0–1)
MONOCYTES NFR BLD: 1.1 % (ref 5–13)
NEUTS SEG # BLD: 3.27 K/UL (ref 1.8–8)
NEUTS SEG NFR BLD: 93.5 % (ref 32–75)
NRBC # BLD: 0 K/UL (ref 0–0.01)
NRBC BLD-RTO: 0 PER 100 WBC
OSMOLALITY UR: 274 MOSM/KG H2O
PERFORMED BY:: ABNORMAL
PHOSPHATE SERPL-MCNC: 3.7 MG/DL (ref 2.6–4.7)
PLATELET # BLD AUTO: 213 K/UL (ref 150–400)
PMV BLD AUTO: 10.3 FL (ref 8.9–12.9)
POTASSIUM UR-SCNC: 23 MMOL/L
PROCALCITONIN SERPL-MCNC: <0.05 NG/ML
RBC # BLD AUTO: 3.11 M/UL (ref 4.1–5.7)
RBC MORPH BLD: ABNORMAL
SODIUM UR-SCNC: 68 MMOL/L
TIBC SERPL-MCNC: 411 UG/DL (ref 250–450)
TRIGL SERPL-MCNC: 34 MG/DL
VIT B12 SERPL-MCNC: 838 PG/ML (ref 193–986)
VLDLC SERPL CALC-MCNC: 6.8 MG/DL
WBC # BLD AUTO: 3.5 K/UL (ref 4.1–11.1)

## 2025-03-19 PROCEDURE — 84466 ASSAY OF TRANSFERRIN: CPT

## 2025-03-19 PROCEDURE — 94761 N-INVAS EAR/PLS OXIMETRY MLT: CPT

## 2025-03-19 PROCEDURE — 6370000000 HC RX 637 (ALT 250 FOR IP): Performed by: INTERNAL MEDICINE

## 2025-03-19 PROCEDURE — 82746 ASSAY OF FOLIC ACID SERUM: CPT

## 2025-03-19 PROCEDURE — 2700000000 HC OXYGEN THERAPY PER DAY

## 2025-03-19 PROCEDURE — 6370000000 HC RX 637 (ALT 250 FOR IP)

## 2025-03-19 PROCEDURE — 6360000004 HC RX CONTRAST MEDICATION

## 2025-03-19 PROCEDURE — 82728 ASSAY OF FERRITIN: CPT

## 2025-03-19 PROCEDURE — 85025 COMPLETE CBC W/AUTO DIFF WBC: CPT

## 2025-03-19 PROCEDURE — 82962 GLUCOSE BLOOD TEST: CPT

## 2025-03-19 PROCEDURE — 6360000002 HC RX W HCPCS

## 2025-03-19 PROCEDURE — 94640 AIRWAY INHALATION TREATMENT: CPT

## 2025-03-19 PROCEDURE — 83540 ASSAY OF IRON: CPT

## 2025-03-19 PROCEDURE — P9016 RBC LEUKOCYTES REDUCED: HCPCS

## 2025-03-19 PROCEDURE — 2060000000 HC ICU INTERMEDIATE R&B

## 2025-03-19 PROCEDURE — 80061 LIPID PANEL: CPT

## 2025-03-19 PROCEDURE — 83036 HEMOGLOBIN GLYCOSYLATED A1C: CPT

## 2025-03-19 PROCEDURE — 84100 ASSAY OF PHOSPHORUS: CPT

## 2025-03-19 PROCEDURE — 36430 TRANSFUSION BLD/BLD COMPNT: CPT

## 2025-03-19 PROCEDURE — 82607 VITAMIN B-12: CPT

## 2025-03-19 PROCEDURE — 83735 ASSAY OF MAGNESIUM: CPT

## 2025-03-19 PROCEDURE — C8929 TTE W OR WO FOL WCON,DOPPLER: HCPCS

## 2025-03-19 PROCEDURE — 83935 ASSAY OF URINE OSMOLALITY: CPT

## 2025-03-19 PROCEDURE — 2500000003 HC RX 250 WO HCPCS

## 2025-03-19 RX ORDER — ONDANSETRON 4 MG/1
4 TABLET, ORALLY DISINTEGRATING ORAL EVERY 8 HOURS PRN
Status: DISCONTINUED | OUTPATIENT
Start: 2025-03-19 | End: 2025-03-23 | Stop reason: HOSPADM

## 2025-03-19 RX ORDER — MECOBALAMIN 5000 MCG
5 TABLET,DISINTEGRATING ORAL NIGHTLY
Status: DISCONTINUED | OUTPATIENT
Start: 2025-03-19 | End: 2025-03-23 | Stop reason: HOSPADM

## 2025-03-19 RX ORDER — SODIUM CHLORIDE 9 MG/ML
INJECTION, SOLUTION INTRAVENOUS PRN
Status: DISCONTINUED | OUTPATIENT
Start: 2025-03-19 | End: 2025-03-23 | Stop reason: HOSPADM

## 2025-03-19 RX ORDER — FUROSEMIDE 10 MG/ML
40 INJECTION INTRAMUSCULAR; INTRAVENOUS ONCE
Status: COMPLETED | OUTPATIENT
Start: 2025-03-19 | End: 2025-03-19

## 2025-03-19 RX ORDER — SODIUM CHLORIDE 0.9 % (FLUSH) 0.9 %
5-40 SYRINGE (ML) INJECTION PRN
Status: DISCONTINUED | OUTPATIENT
Start: 2025-03-19 | End: 2025-03-23 | Stop reason: HOSPADM

## 2025-03-19 RX ORDER — ONDANSETRON 2 MG/ML
4 INJECTION INTRAMUSCULAR; INTRAVENOUS EVERY 6 HOURS PRN
Status: DISCONTINUED | OUTPATIENT
Start: 2025-03-19 | End: 2025-03-23 | Stop reason: HOSPADM

## 2025-03-19 RX ORDER — DOCUSATE SODIUM 100 MG/1
100 CAPSULE, LIQUID FILLED ORAL DAILY
Status: DISCONTINUED | OUTPATIENT
Start: 2025-03-19 | End: 2025-03-23 | Stop reason: HOSPADM

## 2025-03-19 RX ORDER — POTASSIUM CHLORIDE 1500 MG/1
40 TABLET, EXTENDED RELEASE ORAL PRN
Status: DISCONTINUED | OUTPATIENT
Start: 2025-03-19 | End: 2025-03-23 | Stop reason: HOSPADM

## 2025-03-19 RX ORDER — IPRATROPIUM BROMIDE AND ALBUTEROL SULFATE 2.5; .5 MG/3ML; MG/3ML
1 SOLUTION RESPIRATORY (INHALATION)
Status: DISCONTINUED | OUTPATIENT
Start: 2025-03-19 | End: 2025-03-20

## 2025-03-19 RX ORDER — PANTOPRAZOLE SODIUM 40 MG/1
40 TABLET, DELAYED RELEASE ORAL
Status: COMPLETED | OUTPATIENT
Start: 2025-03-19 | End: 2025-03-20

## 2025-03-19 RX ORDER — SODIUM CHLORIDE 0.9 % (FLUSH) 0.9 %
5-40 SYRINGE (ML) INJECTION EVERY 12 HOURS SCHEDULED
Status: DISCONTINUED | OUTPATIENT
Start: 2025-03-19 | End: 2025-03-23 | Stop reason: HOSPADM

## 2025-03-19 RX ORDER — ACETAMINOPHEN 650 MG/1
650 SUPPOSITORY RECTAL EVERY 6 HOURS PRN
Status: DISCONTINUED | OUTPATIENT
Start: 2025-03-19 | End: 2025-03-23 | Stop reason: HOSPADM

## 2025-03-19 RX ORDER — MAGNESIUM SULFATE IN WATER 40 MG/ML
2000 INJECTION, SOLUTION INTRAVENOUS PRN
Status: DISCONTINUED | OUTPATIENT
Start: 2025-03-19 | End: 2025-03-23 | Stop reason: HOSPADM

## 2025-03-19 RX ORDER — SODIUM CHLORIDE 9 MG/ML
INJECTION, SOLUTION INTRAVENOUS PRN
Status: DISCONTINUED | OUTPATIENT
Start: 2025-03-19 | End: 2025-03-19

## 2025-03-19 RX ORDER — ASPIRIN 81 MG/1
81 TABLET, CHEWABLE ORAL DAILY
COMMUNITY

## 2025-03-19 RX ORDER — ACETAMINOPHEN 325 MG/1
650 TABLET ORAL EVERY 6 HOURS PRN
Status: DISCONTINUED | OUTPATIENT
Start: 2025-03-19 | End: 2025-03-23 | Stop reason: HOSPADM

## 2025-03-19 RX ORDER — CARVEDILOL 3.12 MG/1
6.25 TABLET ORAL 2 TIMES DAILY WITH MEALS
Status: DISCONTINUED | OUTPATIENT
Start: 2025-03-19 | End: 2025-03-23 | Stop reason: HOSPADM

## 2025-03-19 RX ORDER — POLYETHYLENE GLYCOL 3350 17 G/17G
17 POWDER, FOR SOLUTION ORAL DAILY PRN
Status: DISCONTINUED | OUTPATIENT
Start: 2025-03-19 | End: 2025-03-20

## 2025-03-19 RX ORDER — ENOXAPARIN SODIUM 100 MG/ML
40 INJECTION SUBCUTANEOUS DAILY
Status: DISCONTINUED | OUTPATIENT
Start: 2025-03-19 | End: 2025-03-19

## 2025-03-19 RX ORDER — POTASSIUM CHLORIDE 7.45 MG/ML
10 INJECTION INTRAVENOUS PRN
Status: DISCONTINUED | OUTPATIENT
Start: 2025-03-19 | End: 2025-03-23 | Stop reason: HOSPADM

## 2025-03-19 RX ADMIN — FUROSEMIDE 40 MG: 10 INJECTION, SOLUTION INTRAMUSCULAR; INTRAVENOUS at 02:47

## 2025-03-19 RX ADMIN — FUROSEMIDE 40 MG: 10 INJECTION, SOLUTION INTRAMUSCULAR; INTRAVENOUS at 20:54

## 2025-03-19 RX ADMIN — FUROSEMIDE 40 MG: 10 INJECTION, SOLUTION INTRAMUSCULAR; INTRAVENOUS at 18:06

## 2025-03-19 RX ADMIN — IPRATROPIUM BROMIDE AND ALBUTEROL SULFATE 1 DOSE: 2.5; .5 SOLUTION RESPIRATORY (INHALATION) at 16:32

## 2025-03-19 RX ADMIN — DOCUSATE SODIUM 100 MG: 100 CAPSULE, LIQUID FILLED ORAL at 10:19

## 2025-03-19 RX ADMIN — IPRATROPIUM BROMIDE AND ALBUTEROL SULFATE 1 DOSE: 2.5; .5 SOLUTION RESPIRATORY (INHALATION) at 21:12

## 2025-03-19 RX ADMIN — POLYETHYLENE GLYCOL 3350 17 G: 17 POWDER, FOR SOLUTION ORAL at 10:00

## 2025-03-19 RX ADMIN — OXYCODONE HYDROCHLORIDE AND ACETAMINOPHEN 1 TABLET: 5; 325 TABLET ORAL at 15:59

## 2025-03-19 RX ADMIN — Medication 5 MG: at 20:55

## 2025-03-19 RX ADMIN — TAMSULOSIN HYDROCHLORIDE 0.4 MG: 0.4 CAPSULE ORAL at 09:15

## 2025-03-19 RX ADMIN — IPRATROPIUM BROMIDE AND ALBUTEROL SULFATE 1 DOSE: 2.5; .5 SOLUTION RESPIRATORY (INHALATION) at 07:14

## 2025-03-19 RX ADMIN — SULFUR HEXAFLUORIDE 5 ML: 60.7; .19; .19 INJECTION, POWDER, LYOPHILIZED, FOR SUSPENSION INTRAVENOUS; INTRAVESICAL at 14:12

## 2025-03-19 RX ADMIN — METHYLPREDNISOLONE SODIUM SUCCINATE 40 MG: 40 INJECTION INTRAMUSCULAR; INTRAVENOUS at 09:15

## 2025-03-19 RX ADMIN — ALPRAZOLAM 0.25 MG: 0.5 TABLET ORAL at 20:55

## 2025-03-19 RX ADMIN — DICLOFENAC SODIUM 2 G: 10 GEL TOPICAL at 21:02

## 2025-03-19 RX ADMIN — SODIUM CHLORIDE, PRESERVATIVE FREE 10 ML: 5 INJECTION INTRAVENOUS at 10:02

## 2025-03-19 RX ADMIN — IPRATROPIUM BROMIDE AND ALBUTEROL SULFATE 1 DOSE: 2.5; .5 SOLUTION RESPIRATORY (INHALATION) at 04:01

## 2025-03-19 RX ADMIN — PANTOPRAZOLE SODIUM 40 MG: 40 TABLET, DELAYED RELEASE ORAL at 18:06

## 2025-03-19 RX ADMIN — FUROSEMIDE 40 MG: 10 INJECTION, SOLUTION INTRAMUSCULAR; INTRAVENOUS at 09:15

## 2025-03-19 RX ADMIN — PREGABALIN 25 MG: 25 CAPSULE ORAL at 09:15

## 2025-03-19 RX ADMIN — SODIUM CHLORIDE, PRESERVATIVE FREE 10 ML: 5 INJECTION INTRAVENOUS at 20:51

## 2025-03-19 RX ADMIN — PREGABALIN 25 MG: 25 CAPSULE ORAL at 20:55

## 2025-03-19 RX ADMIN — PANTOPRAZOLE SODIUM 40 MG: 40 INJECTION, POWDER, FOR SOLUTION INTRAVENOUS at 09:15

## 2025-03-19 RX ADMIN — CARVEDILOL 6.25 MG: 3.12 TABLET, FILM COATED ORAL at 15:48

## 2025-03-19 RX ADMIN — CARVEDILOL 6.25 MG: 3.12 TABLET, FILM COATED ORAL at 20:58

## 2025-03-19 RX ADMIN — ATORVASTATIN CALCIUM 20 MG: 20 TABLET, FILM COATED ORAL at 09:15

## 2025-03-19 RX ADMIN — DICLOFENAC SODIUM 2 G: 10 GEL TOPICAL at 18:01

## 2025-03-19 RX ADMIN — METHYLPREDNISOLONE SODIUM SUCCINATE 40 MG: 40 INJECTION INTRAMUSCULAR; INTRAVENOUS at 20:51

## 2025-03-19 RX ADMIN — ALPRAZOLAM 0.25 MG: 0.5 TABLET ORAL at 00:49

## 2025-03-19 RX ADMIN — OXYCODONE HYDROCHLORIDE AND ACETAMINOPHEN 1 TABLET: 5; 325 TABLET ORAL at 02:15

## 2025-03-19 RX ADMIN — PREGABALIN 25 MG: 25 CAPSULE ORAL at 00:49

## 2025-03-19 ASSESSMENT — PAIN SCALES - GENERAL
PAINLEVEL_OUTOF10: 7
PAINLEVEL_OUTOF10: 10
PAINLEVEL_OUTOF10: 10
PAINLEVEL_OUTOF10: 2
PAINLEVEL_OUTOF10: 2
PAINLEVEL_OUTOF10: 0
PAINLEVEL_OUTOF10: 8
PAINLEVEL_OUTOF10: 0

## 2025-03-19 ASSESSMENT — PAIN DESCRIPTION - ORIENTATION: ORIENTATION: POSTERIOR

## 2025-03-19 ASSESSMENT — PAIN DESCRIPTION - LOCATION
LOCATION: BACK

## 2025-03-19 ASSESSMENT — PAIN - FUNCTIONAL ASSESSMENT
PAIN_FUNCTIONAL_ASSESSMENT: PREVENTS OR INTERFERES SOME ACTIVE ACTIVITIES AND ADLS
PAIN_FUNCTIONAL_ASSESSMENT: PREVENTS OR INTERFERES SOME ACTIVE ACTIVITIES AND ADLS

## 2025-03-19 ASSESSMENT — PAIN SCALES - WONG BAKER: WONGBAKER_NUMERICALRESPONSE: NO HURT

## 2025-03-19 ASSESSMENT — PAIN DESCRIPTION - DESCRIPTORS
DESCRIPTORS: ACHING
DESCRIPTORS: DISCOMFORT;ACHING
DESCRIPTORS: DISCOMFORT;ACHING
DESCRIPTORS: ACHING
DESCRIPTORS: ACHING

## 2025-03-19 ASSESSMENT — PAIN DESCRIPTION - PAIN TYPE: TYPE: CHRONIC PAIN

## 2025-03-19 NOTE — CARE COORDINATION
03/19/25 1520   Service Assessment   Patient Orientation Alert and Oriented   Cognition Alert   History Provided By Patient   Primary Caregiver Self   Accompanied By/Relationship wife and daughter   Support Systems Children;Spouse/Significant Other   Patient's Healthcare Decision Maker is: Legal Next of Kin   PCP Verified by CM Yes  (Callie Samano last week)   Last Visit to PCP Within last 3 months   Prior Functional Level Independent in ADLs/IADLs   Current Functional Level Independent in ADLs/IADLs   Can patient return to prior living arrangement Yes   Ability to make needs known: Good   Family able to assist with home care needs: Yes   Would you like for me to discuss the discharge plan with any other family members/significant others, and if so, who? Yes   Financial Resources Medicare   Social/Functional History   Lives With Daughter;Spouse   Type of Home House   Home Layout One level   Home Access Stairs to enter with rails   Entrance Stairs - Number of Steps 5 steps   Home Equipment Cane   Prior Level of Assist for ADLs Independent   Prior Level of Assist for Homemaking Independent   Ambulation Assistance Independent   Prior Level of Assist for Transfers Independent   Active  Yes   Mode of Transportation Car   Discharge Planning   Type of Residence House   Living Arrangements Spouse/Significant Other   Current Services Prior To Admission None   Potential Assistance Needed N/A   Potential Assistance Purchasing Medications No   Type of Home Care Services None   Patient expects to be discharged to: House   One/Two Story Residence One story   History of falls? 0   Services At/After Discharge   Transition of Care Consult (CM Consult) N/A   Services At/After Discharge None   Confirm Follow Up Transport Family     Patient lives with his wife in a one story home with 5 steps. He has a cane and was driving until 2 weeks ago. Does not have home oxygen. Has had hH services before. No SNF or IRF. Demographics

## 2025-03-19 NOTE — DISCHARGE INSTRUCTIONS
You have been given a copy of the American Heart Association's Heart Failure Educational Booklet.  Please read over this booklet and take it with you to your next physician appointment with any questions you may have.     For additional resources and to access the American Heart Association's Interactive Workbook \"Healthier Living with Heart Failure - Managing Symptoms and Reducing Risk,\" please scan the QR code below.               Download the Heart Failure [place] Amilcar: Search in your Google Play Store (Bioscan) or Infectious Amilcar Store (Redeemia): Search for- HF [place] Amilcar.    https://www.heart.org/en/health-topics/heart-failure/heart-failure-tools-resources/tj-ibgyrs-qth    HF [place] is a brand-new phone amilcar that helps you track daily symptoms, vitals, mood, energy level and more. You can even add your heart failure care team members to view your data and monitor your condition at home.    HF [place] Lets You:  Track symptoms, medications and more  Share health information with your health care team  Connect with others living with heart failure     -----------------------------------

## 2025-03-19 NOTE — ED NOTES
ED TO INPATIENT SBAR HANDOFF    Patient Name: Van Cassidy Jr.   Preferred Name: Van CORTEZB: 1940  84 y.o.   Family/Caregiver Present: no   Code Status Order: No Order  PO Status: NPO:No  Telemetry Order:   C-SSRS: Risk of Suicide: No Risk  Sitter no  No  Restraints:     Sepsis Risk Score      Situation  Chief Complaint   Patient presents with    Shortness of Breath     Brief Description of Patient's Condition: Patient sent from [facility] in [place] for newly diagnosed heart failure. Patient has been short of breath and received several doses of Lasix. His hemoglobin was 4.6 and is receiving 1 unit of PRBCs now.   Mental Status: oriented, alert, coherent, logical, thought processes intact, and able to concentrate and follow conversation  Arrived from:Home  Imaging:   XR CHEST 1 VIEW   Final Result      Slight increased prominence of pulmonary vasculature. Otherwise unchanged   radiograph.         Electronically signed by Nadege Hightower        Abnormal labs:   Abnormal Labs Reviewed   CBC WITH AUTO DIFFERENTIAL - Abnormal; Notable for the following components:       Result Value    RBC 2.36 (*)     Hemoglobin 4.6 (*)     Hematocrit 16.1 (*)     MCV 68.2 (*)     MCH 19.5 (*)     MCHC 28.6 (*)     RDW 17.6 (*)     Monocytes % 13.6 (*)     Lymphocytes Absolute 0.79 (*)     All other components within normal limits   COMPREHENSIVE METABOLIC PANEL - Abnormal; Notable for the following components:    Sodium 123 (*)     Chloride 91 (*)     Total Protein 5.7 (*)     Albumin 3.3 (*)     All other components within normal limits   BRAIN NATRIURETIC PEPTIDE - Abnormal; Notable for the following components:    NT Pro-BNP 1,366 (*)     All other components within normal limits       Background  Allergies:   Allergies   Allergen Reactions    Codeine Hallucinations     History:   Past Medical History:   Diagnosis Date    High cholesterol     HTN (hypertension)        Assessment  Vitals: MEWS Score: 2  Level

## 2025-03-19 NOTE — H&P
Hospitalist Admission Note    NAME: Van Cassidy Jr.   :  1940   MRN:  [id number]     Date/Time:  3/18/2025 8:05 PM    Patient PCP: Ray Salazar MD    ______________________________________________________________________  Given the patient's current clinical presentation, I have a high level of concern for decompensation if discharged from the emergency department.  Complex decision making was performed, which includes reviewing the patient's available past medical records, laboratory results, and x-ray films.       My assessment of this patient's clinical condition and my plan of care is as follows.    Assessment / Plan:    Acute hypoxemic respiratory failure secondary to new onset CHF, suspect ischemic cardiomyopathy/possible COPD  Volume load secondary to CHF, unknown EF  Dyspnea on exertion, multifactorial, CHF/acute anemia/ possible underlying COPD   -On minimal supplemental oxygen, however tachypneic    -X-ray shows vascular edema  -Reportedly patient has not been following up with cardiology since patient's CABG, suspect patient has new onset CHF  -Continue IV diuresis with Lasix 40 twice daily  -Previous history of 40+ pack year smoking history, continue steroid 40 mg twice daily, DuoNeb  -COVID flu negative  -Monitor I's and O's  -Daily weights  -Keep potassium over 4 and magnesium above 2  -Obtain echo   -Appreciate cardiology recommendation, [facility] cardio      Acute microcytic anemia  Hx of Melena   -MCV is 68, suspect iron deficiency anemia  -Hemodynamic stable, no history of anticoagulation  -Hemoglobin is 4.6, 2 PRBC ordered  -IV PPI BID   -will give additional dose of Lasix in between transfusions   -Follow-up fecal occult, and anemia panel  -Patient is volume overloaded, goal will be to keep above 7, even though ideally it 8 would be traget   -will consult GI     CAD status post CABG  Hyperlipidemia  -Continue aspirin and statin    Hyponatremia  -Likely hypervolemic

## 2025-03-19 NOTE — NURSE NAVIGATOR
Heart Failure Education/Evaluation/Recommendations    RN rounds on the patient this morning. Patient states he feels slightly better. Patient states he still has not had a bowel movement, but received stool softeners this morning.    Patient still pending Echo     Patient will receive another unit of blood due to HGB still low    Patient will need continued education and GDMTs to optimized/started once Echo read.    RN recommends continued Diuresis and repeat CBC post RBC 1-2 hours.     RN also recommends PT/OT.

## 2025-03-19 NOTE — PLAN OF CARE
Problem: Discharge Planning  Goal: Discharge to home or other facility with appropriate resources  Outcome: Progressing     Problem: Chronic Conditions and Co-morbidities  Goal: Patient's chronic conditions and co-morbidity symptoms are monitored and maintained or improved  Outcome: Progressing     Problem: Skin/Tissue Integrity  Goal: Skin integrity remains intact  Description: 1.  Monitor for areas of redness and/or skin breakdown  2.  Assess vascular access sites hourly  3.  Every 4-6 hours minimum:  Change oxygen saturation probe site  4.  Every 4-6 hours:  If on nasal continuous positive airway pressure, respiratory therapy assess nares and determine need for appliance change or resting period  Outcome: Progressing     Problem: ABCDS Injury Assessment  Goal: Absence of physical injury  Outcome: Progressing     Problem: Safety - Adult  Goal: Free from fall injury  Outcome: Progressing

## 2025-03-19 NOTE — CARE COORDINATION
03/19/25 1520   Service Assessment   Patient Orientation Alert and Oriented   Cognition Alert   History Provided By Patient   Primary Caregiver Self   Accompanied By/Relationship wife and daughter   Support Systems Children;Spouse/Significant Other   Patient's Healthcare Decision Maker is: Legal Next of Kin   PCP Verified by CM Yes  (Callie Samano last week)   Last Visit to PCP Within last 3 months   Prior Functional Level Independent in ADLs/IADLs   Current Functional Level Independent in ADLs/IADLs   Can patient return to prior living arrangement Yes   Ability to make needs known: Good   Family able to assist with home care needs: Yes   Would you like for me to discuss the discharge plan with any other family members/significant others, and if so, who? Yes   Financial Resources Medicare   Social/Functional History   Lives With Daughter;Spouse   Type of Home House   Home Layout One level   Home Access Stairs to enter with rails   Entrance Stairs - Number of Steps 3 back, 4 front.   Prior Level of Assist for ADLs Independent   Prior Level of Assist for Homemaking Independent   Ambulation Assistance Independent   Prior Level of Assist for Transfers Independent   Active  Yes   Mode of Transportation Car   Discharge Planning   Type of Residence House   Living Arrangements Spouse/Significant Other   Current Services Prior To Admission None   Potential Assistance Needed N/A   Potential Assistance Purchasing Medications No   Type of Home Care Services None   Patient expects to be discharged to: House   One/Two Story Residence One story   History of falls? 0   Services At/After Discharge   Transition of Care Consult (CM Consult) N/A   Services At/After Discharge None   Confirm Follow Up Transport Family     Patient lives with his wife in a one story home with 3 steps back and 4 front. No DME, drives, No HH, No SNF, IRF. Uses [facility] [place] [facility] for his scripts. Demographics verified. Independent with ADLs.

## 2025-03-19 NOTE — PLAN OF CARE
Problem: Discharge Planning  Goal: Discharge to home or other facility with appropriate resources  3/19/2025 0808 by Stella Abdi RN  Outcome: Progressing  3/19/2025 0019 by Dee Terry  Outcome: Progressing     Problem: Chronic Conditions and Co-morbidities  Goal: Patient's chronic conditions and co-morbidity symptoms are monitored and maintained or improved  3/19/2025 0808 by Stella Abdi RN  Outcome: Progressing  3/19/2025 0019 by Dee Terry  Outcome: Progressing     Problem: Skin/Tissue Integrity  Goal: Skin integrity remains intact  Description: 1.  Monitor for areas of redness and/or skin breakdown  2.  Assess vascular access sites hourly  3.  Every 4-6 hours minimum:  Change oxygen saturation probe site  4.  Every 4-6 hours:  If on nasal continuous positive airway pressure, respiratory therapy assess nares and determine need for appliance change or resting period  3/19/2025 0808 by Stella Abdi RN  Outcome: Progressing  3/19/2025 0019 by Dee Terry  Outcome: Progressing     Problem: ABCDS Injury Assessment  Goal: Absence of physical injury  3/19/2025 0808 by Stella Abdi RN  Outcome: Progressing  3/19/2025 0019 by Dee Terry  Outcome: Progressing     Problem: Safety - Adult  Goal: Free from fall injury  3/19/2025 0808 by Stella Abdi RN  Outcome: Progressing  3/19/2025 0019 by Dee Terry  Outcome: Progressing

## 2025-03-20 ENCOUNTER — APPOINTMENT (OUTPATIENT)
Facility: HOSPITAL | Age: 85
DRG: 291 | End: 2025-03-20
Payer: MEDICARE

## 2025-03-20 LAB
ABO + RH BLD: NORMAL
ANION GAP SERPL CALC-SCNC: 4 MMOL/L (ref 2–12)
ANION GAP SERPL CALC-SCNC: 4 MMOL/L (ref 2–12)
ARTERIAL PATENCY WRIST A: YES
BASE EXCESS BLDA CALC-SCNC: 2.2 MMOL/L (ref 0–3)
BASOPHILS # BLD: 0 K/UL (ref 0–0.1)
BASOPHILS NFR BLD: 0 % (ref 0–1)
BDY SITE: ABNORMAL
BLD PROD TYP BPU: NORMAL
BLOOD BANK BLOOD PRODUCT EXPIRATION DATE: NORMAL
BLOOD BANK DISPENSE STATUS: NORMAL
BLOOD BANK ISBT PRODUCT BLOOD TYPE: 6200
BLOOD BANK PRODUCT CODE: NORMAL
BLOOD BANK UNIT TYPE AND RH: NORMAL
BLOOD GROUP ANTIBODIES SERPL: NEGATIVE
BODY TEMPERATURE: 97.9
BPU ID: NORMAL
BUN SERPL-MCNC: 20 MG/DL (ref 6–20)
BUN SERPL-MCNC: 22 MG/DL (ref 6–20)
BUN/CREAT SERPL: 15 (ref 12–20)
BUN/CREAT SERPL: 18 (ref 12–20)
CA-I BLD-MCNC: 7.9 MG/DL (ref 8.5–10.1)
CA-I BLD-MCNC: 8.4 MG/DL (ref 8.5–10.1)
CHLORIDE SERPL-SCNC: 93 MMOL/L (ref 97–108)
CHLORIDE SERPL-SCNC: 94 MMOL/L (ref 97–108)
CO2 SERPL-SCNC: 30 MMOL/L (ref 21–32)
CO2 SERPL-SCNC: 30 MMOL/L (ref 21–32)
COHGB MFR BLD: 1 % (ref 1–2)
COLLECT DATE STL: ABNORMAL
CREAT SERPL-MCNC: 1.2 MG/DL (ref 0.7–1.3)
CREAT SERPL-MCNC: 1.37 MG/DL (ref 0.7–1.3)
CROSSMATCH RESULT: NORMAL
DIFFERENTIAL METHOD BLD: ABNORMAL
EOSINOPHIL # BLD: 0 K/UL (ref 0–0.4)
EOSINOPHIL NFR BLD: 0 % (ref 0–7)
ERYTHROCYTE [DISTWIDTH] IN BLOOD BY AUTOMATED COUNT: 20.2 % (ref 11.5–14.5)
FIO2 ON VENT: 24 %
GAS FLOW.O2 O2 DELIVERY SYS: 1 L/MIN
GLUCOSE BLD STRIP.AUTO-MCNC: 165 MG/DL (ref 65–100)
GLUCOSE BLD STRIP.AUTO-MCNC: 180 MG/DL (ref 65–100)
GLUCOSE BLD STRIP.AUTO-MCNC: 181 MG/DL (ref 65–100)
GLUCOSE SERPL-MCNC: 142 MG/DL (ref 65–100)
GLUCOSE SERPL-MCNC: 143 MG/DL (ref 65–100)
HCO3 BLDA-SCNC: 26 MMOL/L (ref 22–26)
HCT VFR BLD AUTO: 24.1 % (ref 36.6–50.3)
HCT VFR BLD AUTO: 25.9 % (ref 36.6–50.3)
HEMOCCULT SP1 STL QL: POSITIVE
HGB BLD-MCNC: 7.5 G/DL (ref 12.1–17)
HGB BLD-MCNC: 8 G/DL (ref 12.1–17)
IMM GRANULOCYTES # BLD AUTO: 0.05 K/UL (ref 0–0.04)
IMM GRANULOCYTES NFR BLD AUTO: 1.1 % (ref 0–0.5)
LYMPHOCYTES # BLD: 0.2 K/UL (ref 0.8–3.5)
LYMPHOCYTES NFR BLD: 4.3 % (ref 12–49)
MCH RBC QN AUTO: 22.7 PG (ref 26–34)
MCHC RBC AUTO-ENTMCNC: 31.1 G/DL (ref 30–36.5)
MCV RBC AUTO: 73 FL (ref 80–99)
METHGB MFR BLD: 0.6 % (ref 0–1.4)
MONOCYTES # BLD: 0.13 K/UL (ref 0–1)
MONOCYTES NFR BLD: 2.8 % (ref 5–13)
NEUTS SEG # BLD: 4.26 K/UL (ref 1.8–8)
NEUTS SEG NFR BLD: 91.8 % (ref 32–75)
NRBC # BLD: 0.06 K/UL (ref 0–0.01)
NRBC BLD-RTO: 1.3 PER 100 WBC
OSMOLALITY UR: 302 MOSM/KG H2O
OXYHGB MFR BLD: 94.4 % (ref 95–99)
PCO2 BLDA: 37 MMHG (ref 35–45)
PERFORMED BY:: ABNORMAL
PH BLDA: 7.47 (ref 7.35–7.45)
PLATELET # BLD AUTO: 205 K/UL (ref 150–400)
PMV BLD AUTO: 10.3 FL (ref 8.9–12.9)
PO2 BLDA: 82 MMHG (ref 80–100)
POTASSIUM SERPL-SCNC: 4.2 MMOL/L (ref 3.5–5.1)
POTASSIUM SERPL-SCNC: 4.4 MMOL/L (ref 3.5–5.1)
RBC # BLD AUTO: 3.3 M/UL (ref 4.1–5.7)
SAO2 % BLD: 96 % (ref 95–99)
SAO2% DEVICE SAO2% SENSOR NAME: ABNORMAL
SODIUM SERPL-SCNC: 127 MMOL/L (ref 136–145)
SODIUM SERPL-SCNC: 128 MMOL/L (ref 136–145)
SPECIMEN EXP DATE BLD: NORMAL
SPECIMEN SITE: ABNORMAL
TRANSFUSION STATUS PATIENT QL: NORMAL
UNIT DIVISION: 0
UNIT ISSUE DATE/TIME: NORMAL
WBC # BLD AUTO: 4.6 K/UL (ref 4.1–11.1)

## 2025-03-20 PROCEDURE — 71045 X-RAY EXAM CHEST 1 VIEW: CPT

## 2025-03-20 PROCEDURE — 82272 OCCULT BLD FECES 1-3 TESTS: CPT

## 2025-03-20 PROCEDURE — 94640 AIRWAY INHALATION TREATMENT: CPT

## 2025-03-20 PROCEDURE — 6370000000 HC RX 637 (ALT 250 FOR IP)

## 2025-03-20 PROCEDURE — 6360000002 HC RX W HCPCS: Performed by: INTERNAL MEDICINE

## 2025-03-20 PROCEDURE — 97530 THERAPEUTIC ACTIVITIES: CPT

## 2025-03-20 PROCEDURE — 6370000000 HC RX 637 (ALT 250 FOR IP): Performed by: INTERNAL MEDICINE

## 2025-03-20 PROCEDURE — 36415 COLL VENOUS BLD VENIPUNCTURE: CPT

## 2025-03-20 PROCEDURE — 85018 HEMOGLOBIN: CPT

## 2025-03-20 PROCEDURE — 80048 BASIC METABOLIC PNL TOTAL CA: CPT

## 2025-03-20 PROCEDURE — 2060000000 HC ICU INTERMEDIATE R&B

## 2025-03-20 PROCEDURE — 2700000000 HC OXYGEN THERAPY PER DAY

## 2025-03-20 PROCEDURE — 82803 BLOOD GASES ANY COMBINATION: CPT

## 2025-03-20 PROCEDURE — 6360000002 HC RX W HCPCS

## 2025-03-20 PROCEDURE — 2500000003 HC RX 250 WO HCPCS

## 2025-03-20 PROCEDURE — 2580000003 HC RX 258

## 2025-03-20 PROCEDURE — 94761 N-INVAS EAR/PLS OXIMETRY MLT: CPT

## 2025-03-20 PROCEDURE — 82962 GLUCOSE BLOOD TEST: CPT

## 2025-03-20 PROCEDURE — 85025 COMPLETE CBC W/AUTO DIFF WBC: CPT

## 2025-03-20 PROCEDURE — 36600 WITHDRAWAL OF ARTERIAL BLOOD: CPT

## 2025-03-20 PROCEDURE — 97165 OT EVAL LOW COMPLEX 30 MIN: CPT

## 2025-03-20 PROCEDURE — 30233N1 TRANSFUSION OF NONAUTOLOGOUS RED BLOOD CELLS INTO PERIPHERAL VEIN, PERCUTANEOUS APPROACH: ICD-10-PCS

## 2025-03-20 PROCEDURE — 97161 PT EVAL LOW COMPLEX 20 MIN: CPT

## 2025-03-20 PROCEDURE — 85014 HEMATOCRIT: CPT

## 2025-03-20 RX ORDER — METHYLPREDNISOLONE SODIUM SUCCINATE 40 MG/ML
40 INJECTION INTRAMUSCULAR; INTRAVENOUS EVERY 8 HOURS
Status: DISCONTINUED | OUTPATIENT
Start: 2025-03-20 | End: 2025-03-21

## 2025-03-20 RX ORDER — BUDESONIDE AND FORMOTEROL FUMARATE DIHYDRATE 160; 4.5 UG/1; UG/1
2 AEROSOL RESPIRATORY (INHALATION)
Status: DISCONTINUED | OUTPATIENT
Start: 2025-03-20 | End: 2025-03-22

## 2025-03-20 RX ORDER — PANTOPRAZOLE SODIUM 40 MG/10ML
40 INJECTION, POWDER, LYOPHILIZED, FOR SOLUTION INTRAVENOUS DAILY
Status: DISCONTINUED | OUTPATIENT
Start: 2025-03-20 | End: 2025-03-23 | Stop reason: HOSPADM

## 2025-03-20 RX ORDER — FUROSEMIDE 10 MG/ML
20 INJECTION INTRAMUSCULAR; INTRAVENOUS ONCE
Status: COMPLETED | OUTPATIENT
Start: 2025-03-20 | End: 2025-03-20

## 2025-03-20 RX ORDER — IPRATROPIUM BROMIDE AND ALBUTEROL SULFATE 2.5; .5 MG/3ML; MG/3ML
1 SOLUTION RESPIRATORY (INHALATION)
Status: DISCONTINUED | OUTPATIENT
Start: 2025-03-20 | End: 2025-03-22

## 2025-03-20 RX ORDER — POLYETHYLENE GLYCOL 3350 17 G/17G
17 POWDER, FOR SOLUTION ORAL DAILY
Status: DISCONTINUED | OUTPATIENT
Start: 2025-03-20 | End: 2025-03-23 | Stop reason: HOSPADM

## 2025-03-20 RX ORDER — IPRATROPIUM BROMIDE AND ALBUTEROL SULFATE 2.5; .5 MG/3ML; MG/3ML
1 SOLUTION RESPIRATORY (INHALATION)
Status: DISCONTINUED | OUTPATIENT
Start: 2025-03-20 | End: 2025-03-20

## 2025-03-20 RX ADMIN — FUROSEMIDE 40 MG: 10 INJECTION, SOLUTION INTRAMUSCULAR; INTRAVENOUS at 18:27

## 2025-03-20 RX ADMIN — CARVEDILOL 6.25 MG: 3.12 TABLET, FILM COATED ORAL at 18:27

## 2025-03-20 RX ADMIN — PREGABALIN 25 MG: 25 CAPSULE ORAL at 11:08

## 2025-03-20 RX ADMIN — IPRATROPIUM BROMIDE AND ALBUTEROL SULFATE 1 DOSE: 2.5; .5 SOLUTION RESPIRATORY (INHALATION) at 12:45

## 2025-03-20 RX ADMIN — FUROSEMIDE 40 MG: 10 INJECTION, SOLUTION INTRAMUSCULAR; INTRAVENOUS at 11:08

## 2025-03-20 RX ADMIN — OXYCODONE HYDROCHLORIDE AND ACETAMINOPHEN 1 TABLET: 5; 325 TABLET ORAL at 11:44

## 2025-03-20 RX ADMIN — SODIUM CHLORIDE, PRESERVATIVE FREE 10 ML: 5 INJECTION INTRAVENOUS at 21:57

## 2025-03-20 RX ADMIN — TAMSULOSIN HYDROCHLORIDE 0.4 MG: 0.4 CAPSULE ORAL at 11:08

## 2025-03-20 RX ADMIN — PREGABALIN 25 MG: 25 CAPSULE ORAL at 21:50

## 2025-03-20 RX ADMIN — OXYCODONE HYDROCHLORIDE AND ACETAMINOPHEN 1 TABLET: 5; 325 TABLET ORAL at 21:50

## 2025-03-20 RX ADMIN — ALPRAZOLAM 0.25 MG: 0.5 TABLET ORAL at 21:50

## 2025-03-20 RX ADMIN — Medication 15 G: at 21:50

## 2025-03-20 RX ADMIN — DICLOFENAC SODIUM 2 G: 10 GEL TOPICAL at 11:42

## 2025-03-20 RX ADMIN — POLYETHYLENE GLYCOL 3350 17 G: 17 POWDER, FOR SOLUTION ORAL at 18:27

## 2025-03-20 RX ADMIN — ATORVASTATIN CALCIUM 20 MG: 20 TABLET, FILM COATED ORAL at 11:09

## 2025-03-20 RX ADMIN — SODIUM CHLORIDE, PRESERVATIVE FREE 10 ML: 5 INJECTION INTRAVENOUS at 11:15

## 2025-03-20 RX ADMIN — CARVEDILOL 6.25 MG: 3.12 TABLET, FILM COATED ORAL at 11:09

## 2025-03-20 RX ADMIN — IPRATROPIUM BROMIDE AND ALBUTEROL SULFATE 1 DOSE: 2.5; .5 SOLUTION RESPIRATORY (INHALATION) at 19:34

## 2025-03-20 RX ADMIN — Medication 2 PUFF: at 19:34

## 2025-03-20 RX ADMIN — PANTOPRAZOLE SODIUM 40 MG: 40 TABLET, DELAYED RELEASE ORAL at 07:06

## 2025-03-20 RX ADMIN — INSULIN LISPRO 1 UNITS: 100 INJECTION, SOLUTION INTRAVENOUS; SUBCUTANEOUS at 21:53

## 2025-03-20 RX ADMIN — METHYLPREDNISOLONE SODIUM SUCCINATE 40 MG: 40 INJECTION INTRAMUSCULAR; INTRAVENOUS at 18:27

## 2025-03-20 RX ADMIN — METHYLPREDNISOLONE SODIUM SUCCINATE 40 MG: 40 INJECTION INTRAMUSCULAR; INTRAVENOUS at 11:11

## 2025-03-20 RX ADMIN — FUROSEMIDE 20 MG: 10 INJECTION, SOLUTION INTRAMUSCULAR; INTRAVENOUS at 14:01

## 2025-03-20 RX ADMIN — IPRATROPIUM BROMIDE AND ALBUTEROL SULFATE 1 DOSE: 2.5; .5 SOLUTION RESPIRATORY (INHALATION) at 09:00

## 2025-03-20 RX ADMIN — Medication 5 MG: at 21:50

## 2025-03-20 RX ADMIN — Medication 2 PUFF: at 12:45

## 2025-03-20 RX ADMIN — Medication 15 G: at 11:05

## 2025-03-20 RX ADMIN — IPRATROPIUM BROMIDE AND ALBUTEROL SULFATE 1 DOSE: 2.5; .5 SOLUTION RESPIRATORY (INHALATION) at 15:49

## 2025-03-20 RX ADMIN — AZITHROMYCIN MONOHYDRATE 500 MG: 500 INJECTION, POWDER, LYOPHILIZED, FOR SOLUTION INTRAVENOUS at 11:38

## 2025-03-20 RX ADMIN — DOCUSATE SODIUM 100 MG: 100 CAPSULE, LIQUID FILLED ORAL at 11:09

## 2025-03-20 ASSESSMENT — PAIN - FUNCTIONAL ASSESSMENT: PAIN_FUNCTIONAL_ASSESSMENT: PREVENTS OR INTERFERES SOME ACTIVE ACTIVITIES AND ADLS

## 2025-03-20 ASSESSMENT — PAIN DESCRIPTION - DESCRIPTORS: DESCRIPTORS: ACHING

## 2025-03-20 ASSESSMENT — PAIN SCALES - GENERAL
PAINLEVEL_OUTOF10: 6
PAINLEVEL_OUTOF10: 0
PAINLEVEL_OUTOF10: 0
PAINLEVEL_OUTOF10: 7
PAINLEVEL_OUTOF10: 8

## 2025-03-20 ASSESSMENT — PAIN DESCRIPTION - ORIENTATION: ORIENTATION: MID;LOWER

## 2025-03-20 ASSESSMENT — PAIN SCALES - WONG BAKER
WONGBAKER_NUMERICALRESPONSE: NO HURT
WONGBAKER_NUMERICALRESPONSE: NO HURT

## 2025-03-20 ASSESSMENT — PAIN DESCRIPTION - LOCATION
LOCATION: BACK
LOCATION: BACK

## 2025-03-20 NOTE — PLAN OF CARE
PHYSICAL THERAPY EVALUATION  Patient: Van Cassidy Jr. (84 y.o. male)  Date: 3/20/2025  Primary Diagnosis: CHF (congestive heart failure), NYHA class I, acute on chronic, combined (HCC) [I50.43]  Anemia, unspecified type [D64.9]  Congestive heart failure, unspecified HF chronicity, unspecified heart failure type (HCC) [I50.9]       Precautions: Fall Risk, General Precautions                      Recommendations for nursing mobility: Encourage HEP in prep for ADLs/mobility; see handout for details, Frequent repositioning to prevent skin breakdown, and LE elevation for management of edema    In place during session: Peripheral IV, External Catheter, and EKG/telemetry     ASSESSMENT  Pt is a 84 y.o. male admitted on 3/18/2025 for sob; pt currently being treated for CHF,patient not on the O2 at home, now on 2L O2. . Pt semi supine upon PT arrival, agreeable to evaluation. Pt A&O x 4.  Patient lives with wife and indep at home    Based on the objective data described below, the patient currently presents with impaired ability to perform high-level IADLs, impaired strength, decreased activity tolerance, poor safety awareness, impaired balance, and impaired posture. (See below for objective details and assist levels).     Overall pt tolerated session fair today with PT/OT. Pt required  for bed mobility and transfers. Pt amb 140 feet with RW, gt belt and sba to CG; demonstrates gen decreased in strength. Pt will benefit from continued skilled PT to address above deficits and return to PLOF. Current PT DC recommendation Intermittent physical therapy up to 2-3x/week in previous living setting with additional support needed for ADLs  once medically appropriate.  Patient desat to 88% with activity  at 2L and required cues for slowing down with the activity to recover.      GOALS:    Problem: Physical Therapy - Adult  Goal: By Discharge: Performs mobility at highest level of function for planned discharge setting.  See

## 2025-03-20 NOTE — PLAN OF CARE
OCCUPATIONAL THERAPY EVALUATION  Patient: Van Cassidy Jr. (84 y.o. male)  Date: 3/20/2025  Primary Diagnosis: CHF (congestive heart failure), NYHA class I, acute on chronic, combined (HCC) [I50.43]  Anemia, unspecified type [D64.9]  Congestive heart failure, unspecified HF chronicity, unspecified heart failure type (HCC) [I50.9]       Precautions: Fall Risk, General Precautions                Recommendations for nursing mobility: Out of bed to chair for meals, LE elevation for management of edema, AD and gt belt for bed to chair , Amb to bathroom with AD and gait belt, and Assist x1    In place during session:Nasal Cannula 1L, External Catheter, and EKG/telemetry   ASSESSMENT  Pt is a 84 y.o. male presenting to [facility] with c/o increasing dyspnea on exertion, admitted 3/18/25 and currently being treated for acute hypoxic respiratory failure, acute exacerbation of COPD, CHF, hx of CAD s/p CABG, anemia, hyponatremia, hx of AAA s/p stent. Pt received semi-supine in bed upon arrival, AXO x4, and agreeable to OT evaluation.     Based on current observations, pt presents with decreased  functional mobility, independence in ADLs, high-level IADLs, strength, activity tolerance, endurance, safety awareness, balance (see below for objective details and assist levels).     Overall, pt tolerates session fair with inc SOB with activity. Pt transferring from semi supine to sitting at EOB. Pt transferring into standing and turning to sit onto bed side commode with CGA. Pt completing ruben care while sitting with SBA. Pt then completing hand hygiene with set up A. Pt ambulating household distance within room using RW, Spo2 decreasing to 88% and requiring cues for pacing/pursed lip breathing. Pt transferring into chair and Spo2 increasing back to baseline. Pt left with wife in room and all needs met/in reach, pt in no acute distress. Pt will benefit from continued skilled OT services to address current impairments and improve IND and

## 2025-03-20 NOTE — CARE COORDINATION
Current disposition remains home.  Patient was independent with cane at home prior to admission to the hospital.  Patient Failed Adrianne's Egress test, Patient is pending PT/OT evaluations, CM team will follow for recommendations for next level of care and/or equipment needs.

## 2025-03-20 NOTE — PLAN OF CARE
Problem: Discharge Planning  Goal: Discharge to home or other facility with appropriate resources  Outcome: Progressing     Problem: Chronic Conditions and Co-morbidities  Goal: Patient's chronic conditions and co-morbidity symptoms are monitored and maintained or improved  Outcome: Progressing     Problem: Skin/Tissue Integrity  Goal: Skin integrity remains intact  Description: 1.  Monitor for areas of redness and/or skin breakdown  2.  Assess vascular access sites hourly  3.  Every 4-6 hours minimum:  Change oxygen saturation probe site  4.  Every 4-6 hours:  If on nasal continuous positive airway pressure, respiratory therapy assess nares and determine need for appliance change or resting period  Outcome: Progressing     Problem: ABCDS Injury Assessment  Goal: Absence of physical injury  Outcome: Progressing     Problem: Safety - Adult  Goal: Free from fall injury  Outcome: Progressing     Problem: Pain  Goal: Verbalizes/displays adequate comfort level or baseline comfort level  Outcome: Progressing

## 2025-03-20 NOTE — CONSULTS
Gastroenterology Consult Note        Patient: Van Cassidy Jr. MRN: [id number]  SSN: [id number]    YOB: 1940  Age: 84 y.o.  Sex: male      Subjective:    i 84 y.o. male who is being seen for GI bleeding.   84 y.o.  male with PMHx significant for Hypertension, CAD status post CABG, AAA status post stenting, GERD, hyperlipidemia, BPH, previous smoker, hard of hearing who presented to the outpatient cardiology office for evaluation of increasing dyspnea on exertion.     In the ED, patient's severe anemia of 4.6, MCV 68.2, troponin 18, proBNP 1366. Chest x-ray shows pulmonary vascular congestion.  Patient was given a dose of Lasix and 3 units of PRBC. H  From history,he has chronic back pain, has been using NSAIDs for the last 2 to 3 years,  Noticed dark-colored stool,e never had a EGD, or colonoscopy.   today patient feel better, less shortness of breath, had constipation, no bowel movement.     Past Medical History:   Diagnosis Date    High cholesterol     HTN (hypertension)      Past Surgical History:   Procedure Laterality Date    ABDOMINAL AORTIC ANEURYSM REPAIR      OTHER SURGICAL HISTORY      open heart      No family history on file.  Social History     Tobacco Use    Smoking status: Former    Smokeless tobacco: Never   Substance Use Topics    Alcohol use: Not on file      Current Facility-Administered Medications   Medication Dose Route Frequency Provider Last Rate Last Admin    sodium chloride flush 0.9 % injection 5-40 mL  5-40 mL IntraVENous 2 times per day Dea Oden MD   10 mL at 03/19/25 1002    sodium chloride flush 0.9 % injection 5-40 mL  5-40 mL IntraVENous PRN Dea Oden MD        0.9 % sodium chloride infusion   IntraVENous PRN Dea Oden MD        potassium chloride (KLOR-CON M) extended release tablet 40 mEq  40 mEq Oral PRN Dea Oden MD        Or    potassium bicarb-citric acid (EFFER-K) effervescent tablet 40 mEq  40 mEq Oral PRN Akshat 
  IMPRESSION:   Acute hypoxic respiratory failure  Acute exacerbation of COPD  Congestive heart failure HFpEF  History of coronary artery disease status post CABG  Anemia secondary to acute on chronic blood loss  Hyponatremia improving  History of AAA status post stent  Additional workup outlined below  Pt is at high risk of sudden decline and decompensation with life threatening consequenses and continued end organ dysfunction and failure  Pt is critically ill. Time spent with pt and staff actively rendering care, managing pt and coordinating care as stated below; 55 minutes, exclusive of any procedures      RECOMMENDATIONS/PLAN:   84-year-old male came in because of shortness of breath and dyspnea he has significant past medical history of coronary artery disease status post CABG also had AAA repair in the past he was not diagnosed with COPD ex-smoker quit smoking in 2020 25 years ago but used to smoke 1 pack/day for about 40+ years cording to his wife and she noticed that he has wheezing at home but he does not use any inhalers or oxygen came to the hospital hemoglobin was low received packed RBC despite giving steroids inhalers he continues to wheeze has BNP was elevated chest x-ray showed congestive changes patient was given Lasix.  IV Solu-Medrol nebulizer treatment I will add Symbicort inhaler he does not have any inhalers at home  On oxygen 2 L nasal Cannula oxygen as salvage oxygen delivery device to provide high concentration of oxygen to overcome refractory hypoxia;   Chest x-ray showed congestive changes prominent vascular marking elevated BNP agree with Lasix 40 twice a day I will give 1 extra dose of Lasix  2D echo showed ejection fraction about 60% RVSP of 17  Transfuse prn to maintain Hgb > 7  Labs to follow electrolytes, renal function and and blood counts  Bronchial hygiene with respiratory therapy techniques, bronchodilators  Pt needs IV fluids with additives and Drug therapy requiring intensive 
Nutrition Education    Educated on HF Diet Guidelines  Learners: Patient, Family, and Significant Other  Readiness: Eager  Method: Explanation and Handout  Response: Demonstrated Understanding  Discussed rationale of low sodium diet. Reviewed current pt practices regarding salt; reviewed strategies for decreasing sodium content in foods. Pt and family expressed understanding, feel confident in their ability to make these changes. Handout left for reference.  Contact name and number provided.    Laurie Jacinto RD  Contact Number: [phone]    
MD   0.25 mg at 03/19/25 0049    atorvastatin (LIPITOR) tablet 20 mg  20 mg Oral Daily Dea Oden MD        pregabalin (LYRICA) capsule 25 mg  25 mg Oral BID Dea Oden MD   25 mg at 03/19/25 0049    oxyCODONE-acetaminophen (PERCOCET) 5-325 MG per tablet 1 tablet  1 tablet Oral Q6H PRN Dea Oden MD   1 tablet at 03/19/25 0215    lidocaine 4 % external patch 1 patch  1 patch TransDERmal Daily Dea Oden MD        diclofenac sodium (VOLTAREN) 1 % gel 2 g  2 g Topical 4x Daily PRN Dea Oden MD            Allergies   Allergen Reactions    Codeine Hallucinations       Review of Systems:  A comprehensive review of systems was negative except for that written in the History of Present Illness.    Objective:     Vitals:    03/19/25 0700 03/19/25 0714 03/19/25 0731 03/19/25 0815   BP:   (!) 142/65    Pulse: 96 95 97 95   Resp:  18 17    Temp:   98.2 °F (36.8 °C)    TempSrc:   Oral    SpO2:  99% 97%    Weight:       Height:            Physical Exam:  General: NAD  Eyes: sclera anicteric  Oral Cavity: No thrush or ulcers  Neck: no JVD  Chest: Fair bilateral air entry  Heart: normal sounds  Abdomen: soft and non tender   : no null  Lower Extremities: no edema  Skin: no rash  Neuro: intact  Psychiatric: non-depressed          Assessment/Plan:   Hyponatremia  Of unknown duration  Of severe intensity  Sodium 123  Likely hypervolemic hyponatremia and possible underlying SIADH  Urine sodium 68 and sending urine osmolality  Clinically asymptomatic  No indication for 3% saline  Free water restriction 1000 cc in 24-hour  Continue gentle IV diuresis  Stat BMP  If sodium less than 125 will give tolvaptan 15 mg x 1 dose    Hypertension  Initially elevated blood pressures on admission  Blood pressures are better  On gentle IV diuresis  On no other blood pressure medications    Acute hypoxic respiratory failure  Presented with SOB on exertion and lower extremity swelling  Likely from CHF  Unknown 
            negative for nausea, vomiting, diarrhea, and abdominal pain  Genitourinary: negative for frequency, dysuria  Integument:     negative for rash   Hematologic:   negative for easy bruising and gum/nose bleeding  Musculoskel:   negative for myalgias,  back pain  Neurological:   negative for headaches, dizziness, vertigo, weakness  Behavl/Psych: negative for feelings of anxiety, depression       Objective       Vitals, I/O's, Weight     24 hour vital signs  BP  Min: 129/55  Max: 186/126  Temp  Av.9 °F (36.6 °C)  Min: 97.3 °F (36.3 °C)  Max: 98.4 °F (36.9 °C)  Read-Only, Retired: Current/Future Living Arrangements could not be evaluated. This SmartLink does not work with rows of the type: String Type  Resp  Av.1  Min: 14  Max: 34  SpO2  Av.6 %  Min: 95 %  Max: 100 %  Systolic (24hrs), Av , Min:129 , Max:186   Diastolic (24hrs), Av, Min:41, Max:126    Body mass index is 33.44 kg/m².    Intake/Output Summary (Last 24 hours) at 3/19/2025 1044  Last data filed at 3/19/2025 0955  Gross per 24 hour   Intake 668.25 ml   Output 4616 ml   Net -3947.75 ml     Patient Vitals for the past 120 hrs:   Weight   25 1558 100.7 kg (222 lb)   25 0615 105.7 kg (233 lb 0.4 oz)           Admit weight: Weight - Scale: 100.7 kg (222 lb)      Exam       Constitutional Chronically ill-appearing elderly man who was in mild to moderate respiratory distress   HEENT normal atraumatic, no neck masses, normal thyroid   Cardiovascular RRR.  Normal S1/S2.  No M/R/G.both carotids normal upstroke without bruits, radial pulses normal, pedal pulses normal both DP's and PT's LE edema 1+ to 2+   Pulmonary rales R base, L base   Abnominal soft, non-tender, without masses or organomegaly, normal bowel sounds, and no masses palpated   Genitourinary Deferred   Muskuloskeletal No obvious joint deformities.  No clubbing   Neuro Alert and oriented, Non focal neurologic exam   Psychiatric Demonstrates good judgement and

## 2025-03-20 NOTE — PLAN OF CARE
Problem: Discharge Planning  Goal: Discharge to home or other facility with appropriate resources  3/20/2025 0744 by Setlla Abdi RN  Outcome: Progressing  3/19/2025 2357 by Dee Terry  Outcome: Progressing     Problem: Chronic Conditions and Co-morbidities  Goal: Patient's chronic conditions and co-morbidity symptoms are monitored and maintained or improved  3/20/2025 0744 by Stella Abdi RN  Outcome: Progressing  3/19/2025 2357 by Dee Terry  Outcome: Progressing     Problem: Skin/Tissue Integrity  Goal: Skin integrity remains intact  Description: 1.  Monitor for areas of redness and/or skin breakdown  2.  Assess vascular access sites hourly  3.  Every 4-6 hours minimum:  Change oxygen saturation probe site  4.  Every 4-6 hours:  If on nasal continuous positive airway pressure, respiratory therapy assess nares and determine need for appliance change or resting period  3/20/2025 0744 by Stella Abdi RN  Outcome: Progressing  3/19/2025 2357 by Dee Terry  Outcome: Progressing     Problem: ABCDS Injury Assessment  Goal: Absence of physical injury  3/20/2025 0744 by Stella Abdi RN  Outcome: Progressing  3/19/2025 2357 by Dee Terry  Outcome: Progressing     Problem: Safety - Adult  Goal: Free from fall injury  3/20/2025 0744 by Stella Abdi RN  Outcome: Progressing  3/19/2025 2357 by Dee Terry  Outcome: Progressing     Problem: Pain  Goal: Verbalizes/displays adequate comfort level or baseline comfort level  3/20/2025 0744 by Stella Abdi RN  Outcome: Progressing  3/19/2025 2357 by Dee Terry  Outcome: Progressing

## 2025-03-21 LAB
ANION GAP SERPL CALC-SCNC: 7 MMOL/L (ref 2–12)
BUN SERPL-MCNC: 49 MG/DL (ref 6–20)
BUN/CREAT SERPL: 36 (ref 12–20)
CA-I BLD-MCNC: 8 MG/DL (ref 8.5–10.1)
CHLORIDE SERPL-SCNC: 89 MMOL/L (ref 97–108)
CO2 SERPL-SCNC: 31 MMOL/L (ref 21–32)
CREAT SERPL-MCNC: 1.37 MG/DL (ref 0.7–1.3)
ERYTHROCYTE [DISTWIDTH] IN BLOOD BY AUTOMATED COUNT: 21.3 % (ref 11.5–14.5)
GLUCOSE BLD STRIP.AUTO-MCNC: 126 MG/DL (ref 65–100)
GLUCOSE BLD STRIP.AUTO-MCNC: 131 MG/DL (ref 65–100)
GLUCOSE BLD STRIP.AUTO-MCNC: 133 MG/DL (ref 65–100)
GLUCOSE BLD STRIP.AUTO-MCNC: 150 MG/DL (ref 65–100)
GLUCOSE SERPL-MCNC: 139 MG/DL (ref 65–100)
HCT VFR BLD AUTO: 24.5 % (ref 36.6–50.3)
HGB BLD-MCNC: 7.5 G/DL (ref 12.1–17)
MCH RBC QN AUTO: 23 PG (ref 26–34)
MCHC RBC AUTO-ENTMCNC: 30.6 G/DL (ref 30–36.5)
MCV RBC AUTO: 75.2 FL (ref 80–99)
NRBC # BLD: 0.03 K/UL (ref 0–0.01)
NRBC BLD-RTO: 0.4 PER 100 WBC
PERFORMED BY:: ABNORMAL
PLATELET # BLD AUTO: 215 K/UL (ref 150–400)
PMV BLD AUTO: 11.3 FL (ref 8.9–12.9)
POTASSIUM SERPL-SCNC: 4.2 MMOL/L (ref 3.5–5.1)
RBC # BLD AUTO: 3.26 M/UL (ref 4.1–5.7)
SODIUM SERPL-SCNC: 127 MMOL/L (ref 136–145)
TRANSFERRIN SERPL-MCNC: 310 MG/DL (ref 149–313)
WBC # BLD AUTO: 7.5 K/UL (ref 4.1–11.1)

## 2025-03-21 PROCEDURE — 6370000000 HC RX 637 (ALT 250 FOR IP): Performed by: INTERNAL MEDICINE

## 2025-03-21 PROCEDURE — 94761 N-INVAS EAR/PLS OXIMETRY MLT: CPT

## 2025-03-21 PROCEDURE — 94640 AIRWAY INHALATION TREATMENT: CPT

## 2025-03-21 PROCEDURE — 6370000000 HC RX 637 (ALT 250 FOR IP)

## 2025-03-21 PROCEDURE — 6360000002 HC RX W HCPCS

## 2025-03-21 PROCEDURE — 82962 GLUCOSE BLOOD TEST: CPT

## 2025-03-21 PROCEDURE — 2060000000 HC ICU INTERMEDIATE R&B

## 2025-03-21 PROCEDURE — 85027 COMPLETE CBC AUTOMATED: CPT

## 2025-03-21 PROCEDURE — 36415 COLL VENOUS BLD VENIPUNCTURE: CPT

## 2025-03-21 PROCEDURE — 2500000003 HC RX 250 WO HCPCS

## 2025-03-21 PROCEDURE — 2580000003 HC RX 258

## 2025-03-21 PROCEDURE — 2700000000 HC OXYGEN THERAPY PER DAY

## 2025-03-21 PROCEDURE — 80048 BASIC METABOLIC PNL TOTAL CA: CPT

## 2025-03-21 RX ORDER — ENOXAPARIN SODIUM 150 MG/ML
1 INJECTION SUBCUTANEOUS 2 TIMES DAILY
Status: DISCONTINUED | OUTPATIENT
Start: 2025-03-21 | End: 2025-03-21

## 2025-03-21 RX ORDER — PREDNISONE 20 MG/1
20 TABLET ORAL 2 TIMES DAILY
Status: DISCONTINUED | OUTPATIENT
Start: 2025-03-21 | End: 2025-03-23 | Stop reason: HOSPADM

## 2025-03-21 RX ADMIN — CARVEDILOL 6.25 MG: 3.12 TABLET, FILM COATED ORAL at 08:49

## 2025-03-21 RX ADMIN — CARVEDILOL 6.25 MG: 3.12 TABLET, FILM COATED ORAL at 18:37

## 2025-03-21 RX ADMIN — SODIUM CHLORIDE, PRESERVATIVE FREE 10 ML: 5 INJECTION INTRAVENOUS at 21:45

## 2025-03-21 RX ADMIN — Medication 15 G: at 08:49

## 2025-03-21 RX ADMIN — SODIUM CHLORIDE, PRESERVATIVE FREE 10 ML: 5 INJECTION INTRAVENOUS at 09:21

## 2025-03-21 RX ADMIN — Medication 2 PUFF: at 20:41

## 2025-03-21 RX ADMIN — Medication 15 G: at 21:44

## 2025-03-21 RX ADMIN — OXYCODONE HYDROCHLORIDE AND ACETAMINOPHEN 1 TABLET: 5; 325 TABLET ORAL at 08:45

## 2025-03-21 RX ADMIN — PREGABALIN 25 MG: 25 CAPSULE ORAL at 08:49

## 2025-03-21 RX ADMIN — Medication 2 PUFF: at 07:12

## 2025-03-21 RX ADMIN — TAMSULOSIN HYDROCHLORIDE 0.4 MG: 0.4 CAPSULE ORAL at 08:48

## 2025-03-21 RX ADMIN — POLYETHYLENE GLYCOL 3350 17 G: 17 POWDER, FOR SOLUTION ORAL at 08:48

## 2025-03-21 RX ADMIN — ATORVASTATIN CALCIUM 20 MG: 20 TABLET, FILM COATED ORAL at 08:48

## 2025-03-21 RX ADMIN — METHYLPREDNISOLONE SODIUM SUCCINATE 40 MG: 40 INJECTION INTRAMUSCULAR; INTRAVENOUS at 06:34

## 2025-03-21 RX ADMIN — IPRATROPIUM BROMIDE AND ALBUTEROL SULFATE 1 DOSE: 2.5; .5 SOLUTION RESPIRATORY (INHALATION) at 20:40

## 2025-03-21 RX ADMIN — IPRATROPIUM BROMIDE AND ALBUTEROL SULFATE 1 DOSE: 2.5; .5 SOLUTION RESPIRATORY (INHALATION) at 16:06

## 2025-03-21 RX ADMIN — AZITHROMYCIN MONOHYDRATE 500 MG: 500 INJECTION, POWDER, LYOPHILIZED, FOR SOLUTION INTRAVENOUS at 09:04

## 2025-03-21 RX ADMIN — PREGABALIN 25 MG: 25 CAPSULE ORAL at 21:44

## 2025-03-21 RX ADMIN — IPRATROPIUM BROMIDE AND ALBUTEROL SULFATE 1 DOSE: 2.5; .5 SOLUTION RESPIRATORY (INHALATION) at 13:01

## 2025-03-21 RX ADMIN — ALPRAZOLAM 0.25 MG: 0.5 TABLET ORAL at 21:44

## 2025-03-21 RX ADMIN — IPRATROPIUM BROMIDE AND ALBUTEROL SULFATE 1 DOSE: 2.5; .5 SOLUTION RESPIRATORY (INHALATION) at 07:12

## 2025-03-21 RX ADMIN — PANTOPRAZOLE SODIUM 40 MG: 40 INJECTION, POWDER, FOR SOLUTION INTRAVENOUS at 08:50

## 2025-03-21 RX ADMIN — PREDNISONE 20 MG: 20 TABLET ORAL at 21:44

## 2025-03-21 RX ADMIN — IPRATROPIUM BROMIDE AND ALBUTEROL SULFATE 1 DOSE: 2.5; .5 SOLUTION RESPIRATORY (INHALATION) at 00:34

## 2025-03-21 RX ADMIN — Medication 5 MG: at 21:44

## 2025-03-21 RX ADMIN — DOCUSATE SODIUM 100 MG: 100 CAPSULE, LIQUID FILLED ORAL at 08:49

## 2025-03-21 RX ADMIN — IPRATROPIUM BROMIDE AND ALBUTEROL SULFATE 1 DOSE: 2.5; .5 SOLUTION RESPIRATORY (INHALATION) at 03:42

## 2025-03-21 RX ADMIN — OXYCODONE HYDROCHLORIDE AND ACETAMINOPHEN 1 TABLET: 5; 325 TABLET ORAL at 18:40

## 2025-03-21 RX ADMIN — FUROSEMIDE 40 MG: 10 INJECTION, SOLUTION INTRAMUSCULAR; INTRAVENOUS at 08:49

## 2025-03-21 ASSESSMENT — PAIN SCALES - GENERAL
PAINLEVEL_OUTOF10: 8
PAINLEVEL_OUTOF10: 2
PAINLEVEL_OUTOF10: 3
PAINLEVEL_OUTOF10: 0
PAINLEVEL_OUTOF10: 0
PAINLEVEL_OUTOF10: 2

## 2025-03-21 ASSESSMENT — PAIN DESCRIPTION - LOCATION
LOCATION: BACK

## 2025-03-21 ASSESSMENT — PAIN DESCRIPTION - DESCRIPTORS: DESCRIPTORS: ACHING;SQUEEZING

## 2025-03-21 ASSESSMENT — PAIN DESCRIPTION - ORIENTATION
ORIENTATION: LOWER

## 2025-03-22 LAB
ANION GAP SERPL CALC-SCNC: 4 MMOL/L (ref 2–12)
BUN SERPL-MCNC: 63 MG/DL (ref 6–20)
BUN/CREAT SERPL: 50 (ref 12–20)
CA-I BLD-MCNC: 8.3 MG/DL (ref 8.5–10.1)
CHLORIDE SERPL-SCNC: 92 MMOL/L (ref 97–108)
CO2 SERPL-SCNC: 33 MMOL/L (ref 21–32)
CREAT SERPL-MCNC: 1.25 MG/DL (ref 0.7–1.3)
ERYTHROCYTE [DISTWIDTH] IN BLOOD BY AUTOMATED COUNT: 21.9 % (ref 11.5–14.5)
GLUCOSE BLD STRIP.AUTO-MCNC: 111 MG/DL (ref 65–100)
GLUCOSE BLD STRIP.AUTO-MCNC: 114 MG/DL (ref 65–100)
GLUCOSE BLD STRIP.AUTO-MCNC: 132 MG/DL (ref 65–100)
GLUCOSE BLD STRIP.AUTO-MCNC: 165 MG/DL (ref 65–100)
GLUCOSE SERPL-MCNC: 115 MG/DL (ref 65–100)
HCT VFR BLD AUTO: 24.3 % (ref 36.6–50.3)
HGB BLD-MCNC: 7.5 G/DL (ref 12.1–17)
MAGNESIUM SERPL-MCNC: 2.2 MG/DL (ref 1.6–2.4)
MCH RBC QN AUTO: 23.2 PG (ref 26–34)
MCHC RBC AUTO-ENTMCNC: 30.9 G/DL (ref 30–36.5)
MCV RBC AUTO: 75.2 FL (ref 80–99)
NRBC # BLD: 0 K/UL (ref 0–0.01)
NRBC BLD-RTO: 0 PER 100 WBC
PERFORMED BY:: ABNORMAL
PLATELET # BLD AUTO: 196 K/UL (ref 150–400)
PMV BLD AUTO: 10.4 FL (ref 8.9–12.9)
POTASSIUM SERPL-SCNC: 4 MMOL/L (ref 3.5–5.1)
RBC # BLD AUTO: 3.23 M/UL (ref 4.1–5.7)
SODIUM SERPL-SCNC: 129 MMOL/L (ref 136–145)
WBC # BLD AUTO: 8.8 K/UL (ref 4.1–11.1)

## 2025-03-22 PROCEDURE — 6370000000 HC RX 637 (ALT 250 FOR IP)

## 2025-03-22 PROCEDURE — 82962 GLUCOSE BLOOD TEST: CPT

## 2025-03-22 PROCEDURE — 6370000000 HC RX 637 (ALT 250 FOR IP): Performed by: INTERNAL MEDICINE

## 2025-03-22 PROCEDURE — 94640 AIRWAY INHALATION TREATMENT: CPT

## 2025-03-22 PROCEDURE — 2500000003 HC RX 250 WO HCPCS: Performed by: INTERNAL MEDICINE

## 2025-03-22 PROCEDURE — 6360000002 HC RX W HCPCS

## 2025-03-22 PROCEDURE — 80048 BASIC METABOLIC PNL TOTAL CA: CPT

## 2025-03-22 PROCEDURE — 83735 ASSAY OF MAGNESIUM: CPT

## 2025-03-22 PROCEDURE — 2500000003 HC RX 250 WO HCPCS

## 2025-03-22 PROCEDURE — 94761 N-INVAS EAR/PLS OXIMETRY MLT: CPT

## 2025-03-22 PROCEDURE — 85027 COMPLETE CBC AUTOMATED: CPT

## 2025-03-22 PROCEDURE — 2060000000 HC ICU INTERMEDIATE R&B

## 2025-03-22 PROCEDURE — 2580000003 HC RX 258

## 2025-03-22 PROCEDURE — 6360000002 HC RX W HCPCS: Performed by: INTERNAL MEDICINE

## 2025-03-22 PROCEDURE — 36415 COLL VENOUS BLD VENIPUNCTURE: CPT

## 2025-03-22 RX ORDER — BUDESONIDE 0.5 MG/2ML
0.5 INHALANT ORAL
Status: DISCONTINUED | OUTPATIENT
Start: 2025-03-22 | End: 2025-03-23 | Stop reason: HOSPADM

## 2025-03-22 RX ORDER — IPRATROPIUM BROMIDE AND ALBUTEROL SULFATE 2.5; .5 MG/3ML; MG/3ML
1 SOLUTION RESPIRATORY (INHALATION)
Status: DISCONTINUED | OUTPATIENT
Start: 2025-03-22 | End: 2025-03-23 | Stop reason: HOSPADM

## 2025-03-22 RX ADMIN — SODIUM CHLORIDE, PRESERVATIVE FREE 10 ML: 5 INJECTION INTRAVENOUS at 21:22

## 2025-03-22 RX ADMIN — Medication 15 G: at 21:22

## 2025-03-22 RX ADMIN — AMIODARONE HYDROCHLORIDE 150 MG: 1.5 INJECTION, SOLUTION INTRAVENOUS at 17:39

## 2025-03-22 RX ADMIN — Medication 15 G: at 15:43

## 2025-03-22 RX ADMIN — BUDESONIDE INHALATION 500 MCG: 0.5 SUSPENSION RESPIRATORY (INHALATION) at 21:15

## 2025-03-22 RX ADMIN — TAMSULOSIN HYDROCHLORIDE 0.4 MG: 0.4 CAPSULE ORAL at 10:54

## 2025-03-22 RX ADMIN — SODIUM CHLORIDE, PRESERVATIVE FREE 10 ML: 5 INJECTION INTRAVENOUS at 11:01

## 2025-03-22 RX ADMIN — Medication 2 PUFF: at 09:25

## 2025-03-22 RX ADMIN — CARVEDILOL 6.25 MG: 3.12 TABLET, FILM COATED ORAL at 10:50

## 2025-03-22 RX ADMIN — IPRATROPIUM BROMIDE AND ALBUTEROL SULFATE 1 DOSE: 2.5; .5 SOLUTION RESPIRATORY (INHALATION) at 21:15

## 2025-03-22 RX ADMIN — PREGABALIN 25 MG: 25 CAPSULE ORAL at 21:21

## 2025-03-22 RX ADMIN — IPRATROPIUM BROMIDE AND ALBUTEROL SULFATE 1 DOSE: 2.5; .5 SOLUTION RESPIRATORY (INHALATION) at 09:25

## 2025-03-22 RX ADMIN — OXYCODONE HYDROCHLORIDE AND ACETAMINOPHEN 1 TABLET: 5; 325 TABLET ORAL at 10:53

## 2025-03-22 RX ADMIN — AMIODARONE HYDROCHLORIDE 0.5 MG/MIN: 1.8 INJECTION, SOLUTION INTRAVENOUS at 21:25

## 2025-03-22 RX ADMIN — AMIODARONE HYDROCHLORIDE 1 MG/MIN: 1.8 INJECTION, SOLUTION INTRAVENOUS at 17:52

## 2025-03-22 RX ADMIN — PREDNISONE 20 MG: 20 TABLET ORAL at 21:21

## 2025-03-22 RX ADMIN — IPRATROPIUM BROMIDE AND ALBUTEROL SULFATE 1 DOSE: 2.5; .5 SOLUTION RESPIRATORY (INHALATION) at 13:34

## 2025-03-22 RX ADMIN — ATORVASTATIN CALCIUM 20 MG: 20 TABLET, FILM COATED ORAL at 10:53

## 2025-03-22 RX ADMIN — AZITHROMYCIN MONOHYDRATE 500 MG: 500 INJECTION, POWDER, LYOPHILIZED, FOR SOLUTION INTRAVENOUS at 15:37

## 2025-03-22 RX ADMIN — PREGABALIN 25 MG: 25 CAPSULE ORAL at 15:35

## 2025-03-22 RX ADMIN — Medication 5 MG: at 21:21

## 2025-03-22 RX ADMIN — PANTOPRAZOLE SODIUM 40 MG: 40 INJECTION, POWDER, FOR SOLUTION INTRAVENOUS at 11:00

## 2025-03-22 RX ADMIN — PREDNISONE 20 MG: 20 TABLET ORAL at 10:54

## 2025-03-22 RX ADMIN — CARVEDILOL 6.25 MG: 3.12 TABLET, FILM COATED ORAL at 17:40

## 2025-03-22 ASSESSMENT — PAIN SCALES - GENERAL
PAINLEVEL_OUTOF10: 3
PAINLEVEL_OUTOF10: 0
PAINLEVEL_OUTOF10: 0

## 2025-03-22 ASSESSMENT — PAIN DESCRIPTION - DESCRIPTORS: DESCRIPTORS: BURNING

## 2025-03-22 ASSESSMENT — PAIN DESCRIPTION - LOCATION: LOCATION: BACK

## 2025-03-22 ASSESSMENT — PAIN DESCRIPTION - ORIENTATION: ORIENTATION: LOWER

## 2025-03-22 NOTE — PLAN OF CARE
OT tx attempt at 11:52am however pt and pt wife politely refusing saying he is tired and upset he can no longer go home today. Provided education, still refusing at this time. Will continue to follow pt later date/time as able. Thank you.

## 2025-03-22 NOTE — CARE COORDINATION
1013 CM notified by pulm that patient qualifies for nocturnal oxygen only. Nocturnal oxygen study faxed into [employer]. Choice letter signed and placed in chart. Referral sent via [employer].     Oxygen will be delivered to the patient's home.     0812 CM noted discharge order.     Per order, discharge is pending 6 minute walk test, nephro, and pulm clearance.     DCP is home with [facility].   HH order sent via [employer].     CM will continue to follow for walk test results to assess for home oxygen.     Transition of Care Plan:    RUR: 16  Prior Level of Functioning: Needs assistance   Disposition: Home Health  KALEIGH: 3/22/25  If SNF or IPR: Date FOC offered: N/a  Date FOC received: N/a  Accepting facility: N/a  Date authorization started with reference number: N/a  Date authorization received and expires: N/a  Follow up appointments: Per MD  DME needed:   Transportation at discharge: Family  IM/IMM Medicare/ letter given: yes  Is patient a [place] and connected with VA? N/a   If yes, was  transfer form completed and VA notified?   Caregiver Contact: Patient notified   Discharge Caregiver contacted prior to discharge? N/a  Care Conference needed? no  Barriers to discharge: none

## 2025-03-22 NOTE — PLAN OF CARE
Problem: Discharge Planning  Goal: Discharge to home or other facility with appropriate resources  3/22/2025 1643 by Dana Lennon RN  Outcome: Progressing  3/22/2025 0556 by Laurie Carbone RN  Outcome: Progressing     Problem: Chronic Conditions and Co-morbidities  Goal: Patient's chronic conditions and co-morbidity symptoms are monitored and maintained or improved  3/22/2025 1643 by Dana Lennon RN  Outcome: Progressing  3/22/2025 0556 by Laurie Carbone RN  Outcome: Progressing     Problem: Skin/Tissue Integrity  Goal: Skin integrity remains intact  Description: 1.  Monitor for areas of redness and/or skin breakdown  2.  Assess vascular access sites hourly  3.  Every 4-6 hours minimum:  Change oxygen saturation probe site  4.  Every 4-6 hours:  If on nasal continuous positive airway pressure, respiratory therapy assess nares and determine need for appliance change or resting period  3/22/2025 1643 by Dana Lennon RN  Outcome: Progressing  Flowsheets (Taken 3/22/2025 1000)  Skin Integrity Remains Intact: Monitor for areas of redness and/or skin breakdown  3/22/2025 0556 by Laurie Carbone RN  Outcome: Progressing     Problem: ABCDS Injury Assessment  Goal: Absence of physical injury  3/22/2025 1643 by Dana Lennon RN  Outcome: Progressing  3/22/2025 0556 by Laurie Carbone RN  Outcome: Progressing     Problem: Safety - Adult  Goal: Free from fall injury  3/22/2025 1643 by Dana Lennon RN  Outcome: Progressing  3/22/2025 0556 by Laurie Carbone RN  Outcome: Progressing     Problem: Pain  Goal: Verbalizes/displays adequate comfort level or baseline comfort level  3/22/2025 1643 by Dana Lennon RN  Outcome: Progressing  3/22/2025 0556 by Laurie Carbone RN  Outcome: Progressing

## 2025-03-23 VITALS
HEART RATE: 84 BPM | HEIGHT: 70 IN | BODY MASS INDEX: 31.44 KG/M2 | RESPIRATION RATE: 22 BRPM | OXYGEN SATURATION: 94 % | WEIGHT: 219.58 LBS | SYSTOLIC BLOOD PRESSURE: 138 MMHG | DIASTOLIC BLOOD PRESSURE: 61 MMHG | TEMPERATURE: 97.9 F

## 2025-03-23 LAB
ANION GAP SERPL CALC-SCNC: 6 MMOL/L (ref 2–12)
BUN SERPL-MCNC: 53 MG/DL (ref 6–20)
BUN/CREAT SERPL: 56 (ref 12–20)
CA-I BLD-MCNC: 9 MG/DL (ref 8.5–10.1)
CHLORIDE SERPL-SCNC: 95 MMOL/L (ref 97–108)
CO2 SERPL-SCNC: 31 MMOL/L (ref 21–32)
CREAT SERPL-MCNC: 0.94 MG/DL (ref 0.7–1.3)
GLUCOSE BLD STRIP.AUTO-MCNC: 118 MG/DL (ref 65–100)
GLUCOSE BLD STRIP.AUTO-MCNC: 127 MG/DL (ref 65–100)
GLUCOSE SERPL-MCNC: 121 MG/DL (ref 65–100)
HCT VFR BLD AUTO: 26 % (ref 36.6–50.3)
HGB BLD-MCNC: 7.8 G/DL (ref 12.1–17)
MAGNESIUM SERPL-MCNC: 2.4 MG/DL (ref 1.6–2.4)
PERFORMED BY:: ABNORMAL
PERFORMED BY:: ABNORMAL
POTASSIUM SERPL-SCNC: 4 MMOL/L (ref 3.5–5.1)
SODIUM SERPL-SCNC: 132 MMOL/L (ref 136–145)

## 2025-03-23 PROCEDURE — 82962 GLUCOSE BLOOD TEST: CPT

## 2025-03-23 PROCEDURE — 6370000000 HC RX 637 (ALT 250 FOR IP): Performed by: INTERNAL MEDICINE

## 2025-03-23 PROCEDURE — 6370000000 HC RX 637 (ALT 250 FOR IP)

## 2025-03-23 PROCEDURE — 94640 AIRWAY INHALATION TREATMENT: CPT

## 2025-03-23 PROCEDURE — 94761 N-INVAS EAR/PLS OXIMETRY MLT: CPT

## 2025-03-23 PROCEDURE — 85018 HEMOGLOBIN: CPT

## 2025-03-23 PROCEDURE — 36415 COLL VENOUS BLD VENIPUNCTURE: CPT

## 2025-03-23 PROCEDURE — 6360000002 HC RX W HCPCS

## 2025-03-23 PROCEDURE — 2500000003 HC RX 250 WO HCPCS

## 2025-03-23 PROCEDURE — 85014 HEMATOCRIT: CPT

## 2025-03-23 PROCEDURE — 6360000002 HC RX W HCPCS: Performed by: INTERNAL MEDICINE

## 2025-03-23 PROCEDURE — 80048 BASIC METABOLIC PNL TOTAL CA: CPT

## 2025-03-23 PROCEDURE — 83735 ASSAY OF MAGNESIUM: CPT

## 2025-03-23 PROCEDURE — 93005 ELECTROCARDIOGRAM TRACING: CPT | Performed by: INTERNAL MEDICINE

## 2025-03-23 RX ORDER — OXYCODONE AND ACETAMINOPHEN 5; 325 MG/1; MG/1
1 TABLET ORAL EVERY 8 HOURS PRN
Qty: 9 TABLET | Refills: 0 | Status: SHIPPED | OUTPATIENT
Start: 2025-03-23 | End: 2025-03-26

## 2025-03-23 RX ORDER — AZITHROMYCIN 500 MG/1
500 TABLET, FILM COATED ORAL DAILY
Status: DISCONTINUED | OUTPATIENT
Start: 2025-03-23 | End: 2025-03-23 | Stop reason: HOSPADM

## 2025-03-23 RX ORDER — FUROSEMIDE 20 MG/1
20 TABLET ORAL DAILY
Qty: 7 TABLET | Refills: 0 | Status: SHIPPED | OUTPATIENT
Start: 2025-03-26 | End: 2025-04-02

## 2025-03-23 RX ORDER — AMIODARONE HYDROCHLORIDE 200 MG/1
200 TABLET ORAL DAILY
Qty: 30 EACH | Refills: 0 | Status: SHIPPED | OUTPATIENT
Start: 2025-03-24

## 2025-03-23 RX ORDER — FERROUS SULFATE 325(65) MG
325 TABLET ORAL
Qty: 30 TABLET | Refills: 0 | Status: SHIPPED | OUTPATIENT
Start: 2025-03-23

## 2025-03-23 RX ORDER — PREDNISONE 20 MG/1
20 TABLET ORAL 2 TIMES DAILY
Qty: 20 TABLET | Refills: 0 | Status: SHIPPED | OUTPATIENT
Start: 2025-03-23 | End: 2025-04-02

## 2025-03-23 RX ORDER — ALBUTEROL SULFATE 90 UG/1
2 INHALANT RESPIRATORY (INHALATION) EVERY 4 HOURS PRN
Qty: 18 G | Refills: 3 | Status: SHIPPED | OUTPATIENT
Start: 2025-03-23

## 2025-03-23 RX ORDER — AZITHROMYCIN 500 MG/1
500 TABLET, FILM COATED ORAL DAILY
Qty: 2 TABLET | Refills: 0 | Status: SHIPPED | OUTPATIENT
Start: 2025-03-23 | End: 2025-03-25

## 2025-03-23 RX ORDER — AMIODARONE HYDROCHLORIDE 200 MG/1
400 TABLET ORAL DAILY
Status: DISCONTINUED | OUTPATIENT
Start: 2025-03-23 | End: 2025-03-23

## 2025-03-23 RX ORDER — CARVEDILOL 6.25 MG/1
6.25 TABLET ORAL 2 TIMES DAILY WITH MEALS
Qty: 60 TABLET | Refills: 0 | Status: SHIPPED | OUTPATIENT
Start: 2025-03-23

## 2025-03-23 RX ORDER — AMIODARONE HYDROCHLORIDE 200 MG/1
200 TABLET ORAL DAILY
Status: DISCONTINUED | OUTPATIENT
Start: 2025-03-24 | End: 2025-03-23 | Stop reason: HOSPADM

## 2025-03-23 RX ORDER — PANTOPRAZOLE SODIUM 40 MG/1
40 TABLET, DELAYED RELEASE ORAL
Qty: 30 TABLET | Refills: 0 | Status: SHIPPED | OUTPATIENT
Start: 2025-03-23 | End: 2025-04-22

## 2025-03-23 RX ORDER — SODIUM CHLORIDE 1 G/1
1 TABLET ORAL 2 TIMES DAILY
Qty: 14 TABLET | Refills: 0 | Status: SHIPPED | OUTPATIENT
Start: 2025-03-23 | End: 2025-03-30

## 2025-03-23 RX ORDER — SODIUM CHLORIDE 1 G/1
1 TABLET ORAL 2 TIMES DAILY
Status: DISCONTINUED | OUTPATIENT
Start: 2025-03-23 | End: 2025-03-23 | Stop reason: HOSPADM

## 2025-03-23 RX ORDER — BUDESONIDE AND FORMOTEROL FUMARATE DIHYDRATE 160; 4.5 UG/1; UG/1
2 AEROSOL RESPIRATORY (INHALATION) 2 TIMES DAILY
Qty: 40.8 G | Refills: 0 | Status: SHIPPED | OUTPATIENT
Start: 2025-03-23

## 2025-03-23 RX ADMIN — CARVEDILOL 6.25 MG: 3.12 TABLET, FILM COATED ORAL at 09:58

## 2025-03-23 RX ADMIN — AMIODARONE HYDROCHLORIDE 400 MG: 200 TABLET ORAL at 09:58

## 2025-03-23 RX ADMIN — PREDNISONE 20 MG: 20 TABLET ORAL at 09:58

## 2025-03-23 RX ADMIN — PREGABALIN 25 MG: 25 CAPSULE ORAL at 10:03

## 2025-03-23 RX ADMIN — PANTOPRAZOLE SODIUM 40 MG: 40 INJECTION, POWDER, FOR SOLUTION INTRAVENOUS at 10:02

## 2025-03-23 RX ADMIN — ATORVASTATIN CALCIUM 20 MG: 20 TABLET, FILM COATED ORAL at 09:58

## 2025-03-23 RX ADMIN — IPRATROPIUM BROMIDE AND ALBUTEROL SULFATE 1 DOSE: 2.5; .5 SOLUTION RESPIRATORY (INHALATION) at 04:25

## 2025-03-23 RX ADMIN — TAMSULOSIN HYDROCHLORIDE 0.4 MG: 0.4 CAPSULE ORAL at 09:58

## 2025-03-23 RX ADMIN — IPRATROPIUM BROMIDE AND ALBUTEROL SULFATE 1 DOSE: 2.5; .5 SOLUTION RESPIRATORY (INHALATION) at 07:11

## 2025-03-23 RX ADMIN — BUDESONIDE INHALATION 500 MCG: 0.5 SUSPENSION RESPIRATORY (INHALATION) at 07:11

## 2025-03-23 RX ADMIN — Medication 1 G: at 14:08

## 2025-03-23 RX ADMIN — Medication 15 G: at 10:03

## 2025-03-23 RX ADMIN — AZITHROMYCIN DIHYDRATE 500 MG: 500 TABLET ORAL at 14:08

## 2025-03-23 RX ADMIN — OXYCODONE HYDROCHLORIDE AND ACETAMINOPHEN 1 TABLET: 5; 325 TABLET ORAL at 10:05

## 2025-03-23 RX ADMIN — SODIUM CHLORIDE, PRESERVATIVE FREE 10 ML: 5 INJECTION INTRAVENOUS at 10:05

## 2025-03-23 ASSESSMENT — PAIN DESCRIPTION - LOCATION: LOCATION: BACK

## 2025-03-23 ASSESSMENT — PAIN SCALES - WONG BAKER: WONGBAKER_NUMERICALRESPONSE: NO HURT

## 2025-03-23 ASSESSMENT — PAIN DESCRIPTION - ORIENTATION: ORIENTATION: LOWER

## 2025-03-23 ASSESSMENT — PAIN DESCRIPTION - DESCRIPTORS: DESCRIPTORS: DISCOMFORT

## 2025-03-23 ASSESSMENT — PAIN - FUNCTIONAL ASSESSMENT: PAIN_FUNCTIONAL_ASSESSMENT: ACTIVITIES ARE NOT PREVENTED

## 2025-03-23 ASSESSMENT — PAIN SCALES - GENERAL
PAINLEVEL_OUTOF10: 4
PAINLEVEL_OUTOF10: 0

## 2025-03-23 NOTE — CASE COMMUNICATION
Transition of Care Plan:    RUR: 16 %  Prior Level of Functioning: Independent   Disposition: Home with Home Health and home O2 to be delivered to patients home.  CM verified with the [employer].  KALEIGH: 1/23/2025  If SNF or IPR: Date FOC offered: NA  Date FOC received: NA  Accepting facility: [facility]   Date authorization started with reference number: 2/24/2025  Date authorization received and expires: NA  Follow up appointments: NA  DME needed: NA  Transportation at discharge: Family  IM/IMM Medicare/ letter given: 1st and 2nd provided to patient  Is patient a [place] and connected with [facility]? NA   If yes, was  transfer form completed and VA notified?   Caregiver Contact: NA  Discharge Caregiver contacted prior to discharge? NA  Care Conference needed? NO  Barriers to discharge: None

## 2025-03-23 NOTE — PROGRESS NOTES
Nephrology follow-up          Patient: Van Cassidy Jr. MRN: [id number]  SSN: [id number]    YOB: 1940  Age: 84 y.o.  Sex: male      Subjective:   The patient is seen at the bedside  He looks comfortable  On nasal cannula 1 L  Blood pressures are normal  Appropriate hyponatremia correction    Past Medical History:   Diagnosis Date    High cholesterol     HTN (hypertension)      Past Surgical History:   Procedure Laterality Date    ABDOMINAL AORTIC ANEURYSM REPAIR      OTHER SURGICAL HISTORY      open heart      No family history on file.  Social History     Tobacco Use    Smoking status: Former    Smokeless tobacco: Never   Substance Use Topics    Alcohol use: Not on file      Current Facility-Administered Medications   Medication Dose Route Frequency Provider Last Rate Last Admin    urea (URE-NA) packet 15 g  15 g Oral BID Sebas Gomez MD   15 g at 03/21/25 0849    pantoprazole (PROTONIX) injection 40 mg  40 mg IntraVENous Daily Chelle Diallo MD   40 mg at 03/21/25 0850    azithromycin (ZITHROMAX) 500 mg in sodium chloride 0.9 % 250 mL IVPB ([id number])  500 mg IntraVENous Q24H Chelle Diallo  mL/hr at 03/21/25 0904 500 mg at 03/21/25 0904    methylPREDNISolone sodium succ (SOLU-MEDROL) injection 40 mg  40 mg IntraVENous Q8H Chelle Diallo MD   40 mg at 03/21/25 0634    budesonide-formoterol (SYMBICORT) 160-4.5 MCG/ACT inhaler 2 puff  2 puff Inhalation BID RT Antonio Bernal MD   2 puff at 03/21/25 0712    ipratropium 0.5 mg-albuterol 2.5 mg (DUONEB) nebulizer solution 1 Dose  1 Dose Inhalation Q4H RT Antonio Bernal MD   1 Dose at 03/21/25 0712    polyethylene glycol (GLYCOLAX) packet 17 g  17 g Oral Daily Chelle Diallo MD   17 g at 03/21/25 0848    sodium chloride flush 0.9 % injection 5-40 mL  5-40 mL IntraVENous 2 times per day Dea Oden MD   10 mL at 03/20/25 2157    sodium chloride flush 0.9 % injection 5-40 mL  5-40 mL IntraVENous PRN Dea Oden MD        0.9 
         Nephrology follow-up          Patient: Van Cassidy Jr. MRN: [id number]  SSN: [id number]    YOB: 1940  Age: 84 y.o.  Sex: male      Subjective:   The patient is seen at the bedside  He looks comfortable  On RA  Blood pressures are normal  Appropriate hyponatremia correction    Past Medical History:   Diagnosis Date    High cholesterol     HTN (hypertension)      Past Surgical History:   Procedure Laterality Date    ABDOMINAL AORTIC ANEURYSM REPAIR      OTHER SURGICAL HISTORY      open heart      No family history on file.  Social History     Tobacco Use    Smoking status: Former    Smokeless tobacco: Never   Substance Use Topics    Alcohol use: Not on file      Current Facility-Administered Medications   Medication Dose Route Frequency Provider Last Rate Last Admin    amiodarone (CORDARONE) tablet 400 mg  400 mg Oral Daily Chelle Diallo MD   400 mg at 03/23/25 0958    ipratropium 0.5 mg-albuterol 2.5 mg (DUONEB) nebulizer solution 1 Dose  1 Dose Inhalation Q6H RT Antonio Bernal MD   1 Dose at 03/23/25 0711    budesonide (PULMICORT) nebulizer suspension 500 mcg  0.5 mg Nebulization BID RT Antonio Bernal MD   500 mcg at 03/23/25 0711    predniSONE (DELTASONE) tablet 20 mg  20 mg Oral BID Antonio Bernal MD   20 mg at 03/23/25 0958    urea (URE-NA) packet 15 g  15 g Oral BID Sebas Gomez MD   15 g at 03/23/25 1003    pantoprazole (PROTONIX) injection 40 mg  40 mg IntraVENous Daily Chelle Diallo MD   40 mg at 03/23/25 1002    azithromycin (ZITHROMAX) 500 mg in sodium chloride 0.9 % 250 mL IVPB (Monc5Air)  500 mg IntraVENous Q24H Chlele Diallo MD   Stopped at 03/22/25 1648    polyethylene glycol (GLYCOLAX) packet 17 g  17 g Oral Daily Chelle Diallo MD   17 g at 03/21/25 0848    sodium chloride flush 0.9 % injection 5-40 mL  5-40 mL IntraVENous 2 times per day Dea Oden MD   10 mL at 03/23/25 1005    sodium chloride flush 0.9 % injection 5-40 mL  5-40 mL IntraVENous PRN Akshat 
         Nephrology follow-up          Patient: Van Cassidy Jr. MRN: [id number]  SSN: [id number]    YOB: 1940  Age: 84 y.o.  Sex: male      Subjective:   The patient is seen at the bedside  He looks comfortable  On nasal cannula 1 L  Blood pressures are normal  Appropriate hyponatremia correction    Past Medical History:   Diagnosis Date    High cholesterol     HTN (hypertension)      Past Surgical History:   Procedure Laterality Date    ABDOMINAL AORTIC ANEURYSM REPAIR      OTHER SURGICAL HISTORY      open heart      No family history on file.  Social History     Tobacco Use    Smoking status: Former    Smokeless tobacco: Never   Substance Use Topics    Alcohol use: Not on file      Current Facility-Administered Medications   Medication Dose Route Frequency Provider Last Rate Last Admin    urea (URE-NA) packet 15 g  15 g Oral BID Sebas Gomez MD   15 g at 03/20/25 1105    pantoprazole (PROTONIX) injection 40 mg  40 mg IntraVENous Daily Chelle Diallo MD        azithromycin (ZITHROMAX) 500 mg in sodium chloride 0.9 % 250 mL IVPB ([id number])  500 mg IntraVENous Q24H Chelle Diallo MD   Stopped at 03/20/25 1245    methylPREDNISolone sodium succ (SOLU-MEDROL) injection 40 mg  40 mg IntraVENous Q8H Chelle Diallo MD   40 mg at 03/20/25 1111    budesonide-formoterol (SYMBICORT) 160-4.5 MCG/ACT inhaler 2 puff  2 puff Inhalation BID RT Antonio Bernal MD   2 puff at 03/20/25 1245    ipratropium 0.5 mg-albuterol 2.5 mg (DUONEB) nebulizer solution 1 Dose  1 Dose Inhalation Q4H RT Antonio Bernal MD   1 Dose at 03/20/25 1549    polyethylene glycol (GLYCOLAX) packet 17 g  17 g Oral Daily Chelle Diallo MD        sodium chloride flush 0.9 % injection 5-40 mL  5-40 mL IntraVENous 2 times per day Dea Oden MD   10 mL at 03/20/25 1115    sodium chloride flush 0.9 % injection 5-40 mL  5-40 mL IntraVENous PRN Dea Oden MD        0.9 % sodium chloride infusion   IntraVENous PRN Dea Oden MD 
        [facility] at [address] [address]  Phone: [phone]      Progress Note      3/22/2025 2:46 PM  NAME: Van Cassidy Jr.   MRN:  [id number]   Admit Diagnosis: CHF (congestive heart failure), NYHA class I, acute on chronic, combined (HCC) [I50.43]  Anemia, unspecified type [D64.9]  Congestive heart failure, unspecified HF chronicity, unspecified heart failure type (HCC) [I50.9]          Assessment/Plan:     NSVT.  Patient today around 10 AM was noted for the run of asymptomatic 50 seconds long ventricular tachycardia at 176 bpm.  Patient serum potassium and magnesium have been within normal range.  Given patient's history of CAD with CABG in remote past, suspect ischemic etiology.  Will start patient on amiodarone.    New onset congestive heart failure which appears to be HFpEF.  Suspect high output failure due to severe anemia plus minus fluid overload.  Patient after PRBC transfusion and diuresis by IV Lasix has  improved with regard to his shortness of breath.  Echocardiogram on this admission shows preserved LV systolic function with moderate MR. Cardiac enzymes have been negative for MI. Continue Lasix as needed to keep patient euvolemic.  No further cardiac workup at this juncture is planned till patient's anemia source of blood loss is addressed..  Will consider outpatient stress MPI study when patient's hemoglobin is stable and cleared by GI.    CAD, status post 3-vessel CABG, 2008.  Stable without anginal symptoms.  MI ruled out.  Continue beta-blocker with statin.  Will avoid anticoagulants due to patient's severe anemia.  Severe anemia, status post PRBC transfusion.  Hemoglobin today 7.5 post PRBC transfusion, it was 4.6 on admission  Patient okay  to undergo his EGD from cardiac standpoint.  GI is planning to EGD as an outpatient.  Care plan discussed with patient's wife, daughter and nursing staff.         []       High complexity decision making was 
        [facility] at [address] [address]  Phone: [phone]      Progress Note      3/21/2025 11:34 AM  NAME: Van Cassidy Jr.   MRN:  [id number]   Admit Diagnosis: CHF (congestive heart failure), NYHA class I, acute on chronic, combined (HCC) [I50.43]  Anemia, unspecified type [D64.9]  Congestive heart failure, unspecified HF chronicity, unspecified heart failure type (HCC) [I50.9]          Assessment/Plan:     New onset congestive heart failure which appears to be HFpEF.  Suspect high output failure due to severe anemia plus minus fluid overload.  Patient after PRBC transfusion and diuresis by IV Lasix has  improved with regard to his shortness of breath.  Echocardiogram on this admission shows preserved LV systolic function with moderate MR. Cardiac enzymes have been negative for MI. Continue Lasix as needed to keep patient euvolemic.  No further cardiac workup at this juncture is planned.  Will consider outpatient stress MPI study when patient's hemoglobin is stable and cleared by GI.    CAD, status post 3-vessel CABG, 2008.  Stable without anginal symptoms.  MI ruled out.  Continue beta-blocker with statin.  Will avoid anticoagulants due to patient's severe anemia.  Severe anemia, status post PRBC transfusion.  Hemoglobin today 7.5 post PRBC transfusion, it was 4.6 on admission  Patient okay  to undergo his EGD from cardiac standpoint.         []       High complexity decision making was performed in this patient at high risk for decompensation with multiple organ involvement.    Subjective:     Van Cassidy Jr. denies chest pain, dyspnea.  Discussed with RN events overnight.     Review of Systems:     []         Unable to obtain  ROS due to ---   [x]         Total of 12 systems reviewed as follows:     Constitutional: negative fever, negative chills, negative weight loss  Eyes:               negative visual changes  ENT:                negative sore throat, 
    Gastroenterology Progress Note        Patient: Van Cassidy Jr. MRN: [id number]  SSN: [id number]    YOB: 1940  Age: 84 y.o.  Sex: male      Admit Date: 3/18/2025    LOS: 3 days     Subjective:     Patient is examined, still has shortness of breath, off  O2 less edema,  Past Medical History:   Diagnosis Date    High cholesterol     HTN (hypertension)         Current Facility-Administered Medications:     predniSONE (DELTASONE) tablet 20 mg, 20 mg, Oral, BID, Antonio Bernal MD    urea (URE-NA) packet 15 g, 15 g, Oral, BID, Sebas Gomez MD, 15 g at 03/21/25 0849    pantoprazole (PROTONIX) injection 40 mg, 40 mg, IntraVENous, Daily, Chelle Diallo MD, 40 mg at 03/21/25 0850    azithromycin (ZITHROMAX) 500 mg in sodium chloride 0.9 % 250 mL IVPB ([id number]), 500 mg, IntraVENous, Q24H, Chelle Diallo MD, Stopped at 03/21/25 1004    budesonide-formoterol (SYMBICORT) 160-4.5 MCG/ACT inhaler 2 puff, 2 puff, Inhalation, BID RT, Antonio Bernal MD, 2 puff at 03/21/25 0712    ipratropium 0.5 mg-albuterol 2.5 mg (DUONEB) nebulizer solution 1 Dose, 1 Dose, Inhalation, Q4H RT, Antonio Bernal MD, 1 Dose at 03/21/25 1301    polyethylene glycol (GLYCOLAX) packet 17 g, 17 g, Oral, Daily, Chelle Diallo MD, 17 g at 03/21/25 0848    sodium chloride flush 0.9 % injection 5-40 mL, 5-40 mL, IntraVENous, 2 times per day, Dea Oden MD, 10 mL at 03/21/25 0921    sodium chloride flush 0.9 % injection 5-40 mL, 5-40 mL, IntraVENous, PRN, Dea Oden MD    0.9 % sodium chloride infusion, , IntraVENous, PRN, Dea Oden MD    potassium chloride (KLOR-CON M) extended release tablet 40 mEq, 40 mEq, Oral, PRN **OR** potassium bicarb-citric acid (EFFER-K) effervescent tablet 40 mEq, 40 mEq, Oral, PRN **OR** potassium chloride 10 mEq/100 mL IVPB (Peripheral Line), 10 mEq, IntraVENous, PRAkshat GARCIA Tasneem R, MD    magnesium sulfate 2000 mg in 50 mL IVPB premix, 2,000 mg, IntraVENous, PRN, Dea Oden, 
    Gastroenterology Progress Note        Patient: Van Cassidy Jr. MRN: [id number]  SSN: [id number]    YOB: 1940  Age: 84 y.o.  Sex: male      Admit Date: 3/18/2025    LOS: 2 days     Subjective:     Patient is examined, still has shortness of breath, edema,  Past Medical History:   Diagnosis Date    High cholesterol     HTN (hypertension)         Current Facility-Administered Medications:     urea (URE-NA) packet 15 g, 15 g, Oral, BID, Sebas Gomez MD, 15 g at 03/20/25 1105    pantoprazole (PROTONIX) injection 40 mg, 40 mg, IntraVENous, Daily, Chelle Diallo MD    azithromycin (ZITHROMAX) 500 mg in sodium chloride 0.9 % 250 mL IVPB ([id number]), 500 mg, IntraVENous, Q24H, Chelle Diallo MD, Last Rate: 250 mL/hr at 03/20/25 1138, 500 mg at 03/20/25 1138    methylPREDNISolone sodium succ (SOLU-MEDROL) injection 40 mg, 40 mg, IntraVENous, Q8H, Chelle Diallo MD, 40 mg at 03/20/25 1111    budesonide-formoterol (SYMBICORT) 160-4.5 MCG/ACT inhaler 2 puff, 2 puff, Inhalation, BID RT, Antonio Bernal MD, 2 puff at 03/20/25 1245    ipratropium 0.5 mg-albuterol 2.5 mg (DUONEB) nebulizer solution 1 Dose, 1 Dose, Inhalation, Q4H RT, Antonio Bernal MD, 1 Dose at 03/20/25 1245    sodium chloride flush 0.9 % injection 5-40 mL, 5-40 mL, IntraVENous, 2 times per day, Dea Oden MD, 10 mL at 03/20/25 1115    sodium chloride flush 0.9 % injection 5-40 mL, 5-40 mL, IntraVENous, PRN, Dea Oden MD    0.9 % sodium chloride infusion, , IntraVENous, PRN, Dea Oden MD    potassium chloride (KLOR-CON M) extended release tablet 40 mEq, 40 mEq, Oral, PRN **OR** potassium bicarb-citric acid (EFFER-K) effervescent tablet 40 mEq, 40 mEq, Oral, PRN **OR** potassium chloride 10 mEq/100 mL IVPB (Peripheral Line), 10 mEq, IntraVENous, PRN, Dea Oden MD    magnesium sulfate 2000 mg in 50 mL IVPB premix, 2,000 mg, IntraVENous, PRNAkshat Tasneem R, MD    ondansetron (ZOFRAN-ODT) disintegrating tablet 4 
    Hospitalist Progress Note               Daily Progress Note: 3/21/2025      Hospital Day: 4     Chief complaint:   Chief Complaint   Patient presents with    Shortness of Breath        Brief HPI/ Hospital course to date:  Van Cassidy is a 84 y.o.  male with PMHx significant for Hypertension, CAD status post CABG, AAA status post stenting, GERD, hyperlipidemia, BPH, previous smoker, hard of hearing who presented to the outpatient cardiology office for evaluation of increasing dyspnea on exertion.  Patient endorses to orthopnea nocturnal cough bilateral edema for the past 2 weeks.  Patient noted to be 92% on pulse ox suspect he likely has developed CHF was not sent to the ER for further management.        Patient states he was in his baseline state of health until few weeks ago he has been having worsening shortness of breath with minimal exertion associated PND, orthopnea, bilateral lower extremity edema which prompted him to go to the cardiologist office today..  He usually follows up with PCP, however has not follow-up with a cardiologist.  He used to smoke for 40 years, quit 40 years ago.  He never had a EGD, or colonoscopy.  He has chronic back pain, has been using NSAIDs for the last 2 to 3 years, however the last few weeks he has been using Tylenol.  Noticed dark-colored stool, and complaining of constipation.  He tried to go to the bathroom today however his stools were in pellet form.  Right now he is having some shortness of breath while talking.     In the ED, patient afebrile not tachypneic tachycardic hypertensive requiring 1 L of oxygen to maintain saturation above 94%.  CBC shows severe anemia of 4.6, MCV 68.2, platelets 248.  CMP shows sodium of 123, normal creatinine, magnesium, troponin 18, proBNP 1366.  LFTs unremarkable.  Chest x-ray shows pulmonary vascular congestion.  Patient was given a dose of Lasix and PRBC.        We were asked to admit for work up and evaluation of the above problems. 
    Hospitalist Progress Note               Daily Progress Note: 3/22/2025      Hospital Day: 5     Chief complaint:   Chief Complaint   Patient presents with    Shortness of Breath        Brief HPI/ Hospital course to date:  Van Cassidy is a 84 y.o.  male with PMHx significant for Hypertension, CAD status post CABG, AAA status post stenting, GERD, hyperlipidemia, BPH, previous smoker, hard of hearing who presented to the outpatient cardiology office for evaluation of increasing dyspnea on exertion.  Patient endorses to orthopnea nocturnal cough bilateral edema for the past 2 weeks.  Patient noted to be 92% on pulse ox suspect he likely has developed CHF was not sent to the ER for further management.        Patient states he was in his baseline state of health until few weeks ago he has been having worsening shortness of breath with minimal exertion associated PND, orthopnea, bilateral lower extremity edema which prompted him to go to the cardiologist office today..  He usually follows up with PCP, however has not follow-up with a cardiologist.  He used to smoke for 40 years, quit 40 years ago.  He never had a EGD, or colonoscopy.  He has chronic back pain, has been using NSAIDs for the last 2 to 3 years, however the last few weeks he has been using Tylenol.  Noticed dark-colored stool, and complaining of constipation.  He tried to go to the bathroom today however his stools were in pellet form.  Right now he is having some shortness of breath while talking.     In the ED, patient afebrile not tachypneic tachycardic hypertensive requiring 1 L of oxygen to maintain saturation above 94%.  CBC shows severe anemia of 4.6, MCV 68.2, platelets 248.  CMP shows sodium of 123, normal creatinine, magnesium, troponin 18, proBNP 1366.  LFTs unremarkable.  Chest x-ray shows pulmonary vascular congestion.  Patient was given a dose of Lasix and PRBC.        We were asked to admit for work up and evaluation of the above problems. 
   03/22/25 0959   Resting (Room Air)   SpO2 96   During Walk (Room Air)   SpO2 93   Rate of Dyspnea 0   During Walk (On O2)   Walk/Assistance Device Walker   After Walk   Does the Patient Qualify for Home O2 No   Does the Patient Need Portable Oxygen Tanks No       
  IMPRESSION:   Acute hypoxic respiratory failure now on room air  COPD  Congestive heart failure HFpEF  History of coronary artery disease status post CABG  Anemia secondary to acute on chronic blood loss  Hyponatremia improved  History of AAA status post stent      RECOMMENDATIONS/PLAN:   84-year-old male came in because of shortness of breath and dyspnea he has significant past medical history of coronary artery disease status post CABG also had AAA repair in the past he was not diagnosed with COPD ex-smoker quit smoking in 2020 25 years ago but used to smoke 1 pack/day for about 40+ years cording to his wife and she noticed that he has wheezing at home but he does not use any inhalers or oxygen came to the hospital hemoglobin was low received packed RBC despite giving steroids inhalers he continues to wheeze has BNP was elevated chest x-ray showed congestive changes patient was given Lasix.  IV Solu-Medrol nebulizer treatment I will add Symbicort inhaler he does not have any inhalers at home  On oxygen 2 L nasal Cannula oxygen as salvage oxygen delivery device to provide high concentration of oxygen to overcome refractory hypoxia;   Chest x-ray showed congestive changes prominent vascular marking elevated BNP agree with Lasix 40 twice a day received 1 dose of extra Lasix yesterday  2D echo showed ejection fraction about 60% RVSP of 17   3/21  Patient seen and examined alert awake hard of hearing wife is at bedside condition improved received extra dose of Lasix yesterday discontinue Solu-Medrol start patient on prednisone creatinine 1.37 will continue to monitor, will get overnight pulse oximetry, hemoglobin dropped again to 7.5 came in with admission of 4.6 hemoglobin GI on the case possible need endoscopy cleared from pulmonary standpoint for endoscopy  3/22  Alert awake sitting in a chair patient had overnight pulse oximetry done which shows desaturation for 1 hour 40-minute qualifies for nocturnal oxygen but 
  IMPRESSION:   Acute hypoxic respiratory failure now on room air  COPD  Congestive heart failure HFpEF  History of coronary artery disease status post CABG  Anemia secondary to acute on chronic blood loss  Hyponatremia improving  History of AAA status post stent      RECOMMENDATIONS/PLAN:   84-year-old male came in because of shortness of breath and dyspnea he has significant past medical history of coronary artery disease status post CABG also had AAA repair in the past he was not diagnosed with COPD ex-smoker quit smoking in 2020 25 years ago but used to smoke 1 pack/day for about 40+ years cording to his wife and she noticed that he has wheezing at home but he does not use any inhalers or oxygen came to the hospital hemoglobin was low received packed RBC despite giving steroids inhalers he continues to wheeze has BNP was elevated chest x-ray showed congestive changes patient was given Lasix.  IV Solu-Medrol nebulizer treatment I will add Symbicort inhaler he does not have any inhalers at home  On oxygen 2 L nasal Cannula oxygen as salvage oxygen delivery device to provide high concentration of oxygen to overcome refractory hypoxia;   Chest x-ray showed congestive changes prominent vascular marking elevated BNP agree with Lasix 40 twice a day received 1 dose of extra Lasix yesterday  2D echo showed ejection fraction about 60% RVSP of 17   3/21  Patient seen and examined alert awake hard of hearing wife is at bedside condition improved received extra dose of Lasix yesterday discontinue Solu-Medrol start patient on prednisone creatinine 1.37 will continue to monitor, will get overnight pulse oximetry, hemoglobin dropped again to 7.5 came in with admission of 4.6 hemoglobin GI on the case possible need endoscopy cleared from pulmonary standpoint for endoscopy  3/22  Alert awake sitting in a chair patient had overnight pulse oximetry done which shows desaturation for 1 hour 40-minute qualifies for nocturnal oxygen 
4 Eyes Skin Assessment     NAME:  Van Cassidy Jr.  YOB: 1940  MEDICAL RECORD NUMBER:  [id number]    The patient is being assessed for  Admission    I agree that at least one RN has performed a thorough Head to Toe Skin Assessment on the patient. ALL assessment sites listed below have been assessed.      Areas assessed by both nurses:    Head, Face, Ears, Shoulders, Back, Chest, Arms, Elbows, Hands, Sacrum. Buttock, Coccyx, Ischium, Legs. Feet and Heels, and Under Medical Devices         Does the Patient have a Wound? No noted wound(s)       Jesus Prevention initiated by RN: No  Wound Care Orders initiated by RN: No    Pressure Injury (Stage 3,4, Unstageable, DTI, NWPT, and Complex wounds) if present, place Wound referral order by RN under : No    New Ostomies, if present place, Ostomy referral order under : No     Nurse 1 eSignature: Electronically signed by Teresa Rabago RN on 3/19/25 at 3:57 AM EDT    **SHARE this note so that the co-signing nurse can place an eSignature**    Nurse 2 eSignature: Electronically signed by Cata Sexton RN on 3/19/25 at 4:39 AM EDT   
Blood transfusion consent signed by patient who is fully alert and oriented, witnessed by NOD and patient's wife at bedside aware.  Consent attached to chart.  
Echo completed. Report to follow.    
Patient followed by nephro, cardio and pulmonology. Patient will continue on IV steroids and once respiratory status improves, GI will consider EGD. HGB down to 7.5 today.     DCP is home with wife. Therapy has given recommendations for Intermittent physical therapy up to 2-3x/week in previous living setting with additional support needed for ADLs  once medically appropriate. CM created careport profile and uploaded clinicals. CM will speak to patient and his wife to determine discharge plans.     Prior to admission patient was feeling so bad, he was not able to drive, but was still ambulatory with a cane.     1340 pm  CM in to speak with patient and his wife. They agreed for HH services. Referral sent. Choice given for no preference.     1435 pm  Patient accepted with [facility] HH services. SOC 3/24/25.     
Patient received discharge instructions and informed of follow up appts. Patient discharged home with wife and grandson via wheelchair. Patient has home health set up as well.   
Patient's wife came and shown to Dr. Diallo, who is at bedside for patient's DNR and living will; copy attached to patient's chart and patient per order is now shifted to DNR.  
[facility]RT Clinical Rounding Nurse Progress Report      SUBJECTIVE: Patient assessed secondary to elevated Deterioration Index Score.      Vitals:    03/18/25 2015 03/18/25 2026 03/18/25 2050 03/18/25 2100   BP: (!) 170/47 (!) 151/54  (!) 152/58   Pulse: (!) 102  (!) 101 (!) 106   Resp: 29   (!) 34   Temp:  98.2 °F (36.8 °C)  97.3 °F (36.3 °C)   TempSrc:  Axillary  Oral   SpO2: 100%      Weight:       Height:            DETERIORATION INDEX SCORE: 70      ASSESSMENT:  Nurse Teresa PALAFOX notified about patient's DI score of 70.    PLAN:   Primary nurse to notify nursing supervisor if assistance is needed.     Jack Pemberton RN    
Received Order for Telemetry     Van RAMIREZ Khanh Lechuga   1940   [id number]   CHF (congestive heart failure), NYHA class I, acute on chronic, combined (HCC) [I50.43]   Dea Oden MD     Tele Box # [phone] placed on patient at  2045 pm  ER Room # 4  Admitting to Room 463  Transferring Nurse BETTY  Verified with Primary Nurse DAISY at  2100 pm   
Spiritual Health History and Assessment/Progress Note  [facility]    Spiritual/Emotional Needs,  ,  ,      Name: Van Cassidy Jr. MRN: [id number]    Age: 84 y.o.     Sex: male   Language: English   Denominational: Unknown   CHF (congestive heart failure), NYHA class I, acute on chronic, combined (HCC)     Date: 3/19/2025            Total Time Calculated: 22 min              Spiritual Assessment began in [facility]        Referral/Consult From: Nurse   Encounter Overview/Reason: Spiritual/Emotional Needs  Service Provided For: Patient and family together    Cecelia, Belief, Meaning:   Patient identifies as spiritual, is connected with a cecelia tradition or spiritual practice, and has beliefs or practices that help with coping during difficult times  Family/Friends identify as spiritual, are connected with a cecelia tradition or spiritual practice, and have beliefs or practices that help with coping during difficult times      Importance and Influence:  Patient has spiritual/personal beliefs that influence decisions regarding their health  Family/Friends have spiritual/personal beliefs that influence decisions regarding the patient's health    Community:  Patient feels well-supported. Support system includes: Spouse/Partner, Children, and Extended family  Family/Friends feel well-supported. Support system includes: Spouse/Partner, Children, and Extended family    Assessment and Plan of Care:     Patient Interventions include: Facilitated expression of thoughts and feelings, Explored spiritual coping/struggle/distress, Affirmed coping skills/support systems, and Facilitated life review and/ or legacy  Family/Friends Interventions include: Facilitated expression of thoughts and feelings, Explored spiritual coping/struggle/distress, and Affirmed coping skills/support systems    Patient Plan of Care: Spiritual Care available upon further referral  Family/Friends Plan of Care: Spiritual Care available 
Dose Route Frequency    urea (URE-NA) packet 15 g  15 g Oral BID    pantoprazole (PROTONIX) injection 40 mg  40 mg IntraVENous Daily    azithromycin (ZITHROMAX) 500 mg in sodium chloride 0.9 % 250 mL IVPB ([id number])  500 mg IntraVENous Q24H    ipratropium 0.5 mg-albuterol 2.5 mg (DUONEB) nebulizer solution 1 Dose  1 Dose Inhalation Q4H RT    sodium chloride flush 0.9 % injection 5-40 mL  5-40 mL IntraVENous 2 times per day    sodium chloride flush 0.9 % injection 5-40 mL  5-40 mL IntraVENous PRN    0.9 % sodium chloride infusion   IntraVENous PRN    potassium chloride (KLOR-CON M) extended release tablet 40 mEq  40 mEq Oral PRN    Or    potassium bicarb-citric acid (EFFER-K) effervescent tablet 40 mEq  40 mEq Oral PRN    Or    potassium chloride 10 mEq/100 mL IVPB (Peripheral Line)  10 mEq IntraVENous PRN    magnesium sulfate 2000 mg in 50 mL IVPB premix  2,000 mg IntraVENous PRN    ondansetron (ZOFRAN-ODT) disintegrating tablet 4 mg  4 mg Oral Q8H PRN    Or    ondansetron (ZOFRAN) injection 4 mg  4 mg IntraVENous Q6H PRN    polyethylene glycol (GLYCOLAX) packet 17 g  17 g Oral Daily PRN    acetaminophen (TYLENOL) tablet 650 mg  650 mg Oral Q6H PRN    Or    acetaminophen (TYLENOL) suppository 650 mg  650 mg Rectal Q6H PRN    docusate sodium (COLACE) capsule 100 mg  100 mg Oral Daily    melatonin disintegrating tablet 5 mg  5 mg Oral Nightly    carvedilol (COREG) tablet 6.25 mg  6.25 mg Oral BID WC    0.9 % sodium chloride infusion   IntraVENous PRN    0.9 % sodium chloride infusion   IntraVENous PRN    furosemide (LASIX) injection 40 mg  40 mg IntraVENous BID    0.9 % sodium chloride infusion   IntraVENous PRN    methylPREDNISolone sodium succ (SOLU-MEDROL) injection 40 mg  40 mg IntraVENous Q12H    glucose chewable tablet 16 g  4 tablet Oral PRN    dextrose bolus 10% 125 mL  125 mL IntraVENous PRN    Or    dextrose bolus 10% 250 mL  250 mL IntraVENous PRN    glucagon injection 1 mg  1 mg SubCUTAneous PRN    
hold IV Lasix due to worsening BUN/creatinine.  NaCL 1 gm bid for 1 week.  Ok to dc from renal standpoint.      Hypertension  Initially elevated blood pressures on admission  Blood pressures are better  held IV diuresis  On no other blood pressure medications    Acute hypoxic respiratory failure  Presented with SOB on exertion and lower extremity swelling  Likely from CHF  Unknown LVEF  Status post CABG  Chest x-ray mild vascular congestion  Clinically improving  held IV diuresis  Free water restriction    Anemia  Severe  History of melena  Hemoglobin 4.6 only  Received blood transfusion 2 units  Rule out GI bleed  GI consulted  Hb 7.5    Acute urinary retention  Underlying BPH  Straight cath put out 800 cc  Continue Flomax  No voiding issues      Plan:       Signed By: Sebas Gomez MD     March 22, 2025       
    Echo:     03/18/25    ECHO (TTE) COMPLETE (PRN CONTRAST/BUBBLE/STRAIN/3D) 03/19/2025  3:06 PM (Final)    Interpretation Summary    Left Ventricle: Normal left ventricular systolic function with a visually estimated EF of 60 - 65%. Left ventricle is dilated. Increased wall thickness. EF BP is 63%. Normal wall motion.    Aortic Valve: Mild to moderate regurgitation.    Mitral Valve: Moderate regurgitation with an eccentrically directed jet and may underestimate severity. MR Radius PISA is 0.90 cm.    Tricuspid Valve: Mild regurgitation. The estimated RVSP is 17 mmHg.    Left Atrium: Left atrium is dilated.    Aorta: Normal sized ascending aorta. Dilated aortic root. Ao root diameter is 4.1 cm.    Image quality is adequate. Contrast used: Lumason. Technically difficult study and technically difficult study due to patient's body habitus.    Signed by: Jose Barnett on 3/19/2025  3:06 PM       Patient's  EKG, laboratory data and echocardiogram were individually reviewed by me.      Lab Data:    Recent Labs     03/19/25  0656 03/20/25  0234   WBC 3.5* 4.6   HGB 6.9* 7.5*   HCT 23.1* 24.1*    205     No results for input(s): \"INR\", \"APTT\" in the last 72 hours.    Invalid input(s): \"PTP\"   Recent Labs     03/18/25  1648 03/19/25  0656 03/20/25  0234   *  --  127*  128*   K 4.3  --  4.4  4.2   CL 91*  --  93*  94*   CO2 26  --  30  30   BUN 14  --  22*  20   MG 1.9 1.9  --      No results for input(s): \"CPK\" in the last 72 hours.    Invalid input(s): \"CPKMB\", \"CKNDX\", \"TROIQ\"  Lab Results   Component Value Date/Time    CHOL 76 03/19/2025 06:56 AM    HDL 47 03/19/2025 06:56 AM    LDL 22.2 03/19/2025 06:56 AM       Recent Labs     03/18/25  1648   GLOB 2.4     No results for input(s): \"PH\", \"PCO2\", \"PO2\" in the last 72 hours.    Medications Personally Reviewed:    Current Facility-Administered Medications   Medication Dose Route Frequency    urea (URE-NA) packet 15 g  15 g Oral BID    pantoprazole 
All current labs were reviewed and interpreted for clinical significance   [x] Appropriate follow-up labs were ordered  [] Collateral history obtained from:       Time spent with patient including counseling, chart review and nursing communication: 38 minutes    Chelle Diallo MD    
dextrose bolus 10% 125 mL  125 mL IntraVENous PRN    Or    dextrose bolus 10% 250 mL  250 mL IntraVENous PRN    glucagon injection 1 mg  1 mg SubCUTAneous PRN    dextrose 10 % infusion   IntraVENous Continuous PRN    insulin lispro (HUMALOG,ADMELOG) injection vial 0-4 Units  0-4 Units SubCUTAneous 4x Daily AC & HS    tamsulosin (FLOMAX) capsule 0.4 mg  0.4 mg Oral Daily    ALPRAZolam (XANAX) tablet 0.25 mg  0.25 mg Oral Nightly PRN    atorvastatin (LIPITOR) tablet 20 mg  20 mg Oral Daily    pregabalin (LYRICA) capsule 25 mg  25 mg Oral BID    oxyCODONE-acetaminophen (PERCOCET) 5-325 MG per tablet 1 tablet  1 tablet Oral Q6H PRN    lidocaine 4 % external patch 1 patch  1 patch TransDERmal Daily    diclofenac sodium (VOLTAREN) 1 % gel 2 g  2 g Topical 4x Daily PRN         Prior to Admission medications    Medication Sig Start Date End Date Taking? Authorizing Provider   aspirin 81 MG chewable tablet Take 1 tablet by mouth daily   Yes ProviderJake MD   pregabalin (LYRICA) 25 MG capsule Take 1 capsule by mouth 2 times daily.   Yes ProviderJake MD   lidocaine (LIDODERM) 5 % Place 1 patch onto the skin daily 12 hours on, 12 hours off.   Yes ProviderJake MD   ALPRAZolam (XANAX) 0.25 MG tablet Take 1 tablet by mouth nightly as needed for Sleep.   Yes ProviderJake MD   atorvastatin (LIPITOR) 20 MG tablet Take 1 tablet by mouth daily ceived the following from [facility]: Outside name: atorvastatin (LIPITOR) 20 mg tablet 2/8/23  Yes Automatic Reconciliation, Ar   losartan-hydroCHLOROthiazide (HYZAAR) 100-12.5 MG per tablet Take 1 tablet by mouth daily ceived the following from [facility]: Outside name: losartan-hydroCHLOROthiazide (HYZAAR) 100-12.5 mg per tablet 2/8/23  Yes Automatic Reconciliation, Ar   tamsulosin (FLOMAX) 0.4 MG capsule Take 1 capsule by mouth daily ceived the following from [facility]: Outside name: tamsulosin (FLOMAX) 
unchanged radiograph. Electronically signed by Nadege Hightower       This care involved high complexity decision making which includes independently reviewing the patient's past medical records, current laboratory results, medication profiles that were immediately available to me and actual Xray images at the bedside in order to assess, support vital system function, and to treat this degree of vital organ system failure, and to prevent further life threatening deterioration of the patient’s condition.  Medical Decision Making Today  Reviewed the flowsheet and previous days notes  Reviewed and summarized records or history from previous days note or discussions with staff, family  Parenteral controlled substances - Reviewed/ Adjusted / Weaned / Started  High Risk Drug therapy requiring intensive monitoring for toxicity: eg steroids, pressors, antibiotics  Review and order of Clinical lab tests  Review and Order of Radiology tests  Review and Order of Medicine tests  Independent visualization of radiologic Images  Reviewed Ventilator / NiPPV  I have personally reviewed the patients ECG / Telemetry  Diagnostic endoscopies with identified risk factors

## 2025-03-23 NOTE — DISCHARGE SUMMARY
start Lasix in 2 days given the fact that he still has ongoing anemia and high risk taking light fluid once again.  Sodium tabs per nephrology.  He will follow-up with pulmonology for official COPD diagnosis but will discharge with inhaler.  Otherwise, for his chronic back pain, he was given short course of Percocet will follow-up with PCP.  If any issues with medications, family or patient will call back.    Discussed with patient that he really needs to follow-up with GI closely as well as PCP, pulmonology, nephrology, cardiology.  He reports understanding agreeable with plan.  Discharged in stable condition.    Of note, he was discharged with nocturnal O2 until followup with Pulmonology.     Discharge Medications:      Medication List        START taking these medications      amiodarone 200 MG tablet  Commonly known as: CORDARONE  Take 1 tablet by mouth daily  Start taking on: March 24, 2025     azithromycin 500 MG tablet  Commonly known as: ZITHROMAX  Take 1 tablet by mouth daily for 2 doses     budesonide-formoterol 160-4.5 MCG/ACT Aero  Commonly known as: Symbicort  Inhale 2 puffs into the lungs 2 times daily     carvedilol 6.25 MG tablet  Commonly known as: COREG  Take 1 tablet by mouth 2 times daily (with meals)     ferrous sulfate 325 (65 Fe) MG tablet  Commonly known as: IRON 325  Take 1 tablet by mouth daily (with breakfast)     furosemide 20 MG tablet  Commonly known as: Lasix  Take 1 tablet by mouth daily for 7 days Start in 2 days and followup with PCP and Nephrology closely for labs  Start taking on: March 26, 2025     oxyCODONE-acetaminophen 5-325 MG per tablet  Commonly known as: PERCOCET  Take 1 tablet by mouth every 8 hours as needed for Pain for up to 3 days. Max Daily Amount: 3 tablets     pantoprazole 40 MG tablet  Commonly known as: PROTONIX  Take 1 tablet by mouth every morning (before breakfast)     predniSONE 20 MG tablet  Commonly known as: DELTASONE  Take 1 tablet by mouth 2 times daily

## 2025-03-23 NOTE — PLAN OF CARE
Problem: Discharge Planning  Goal: Discharge to home or other facility with appropriate resources  3/23/2025 1041 by Eleuterio Taylor RN  Outcome: Adequate for Discharge  3/23/2025 0117 by Cata Sexton RN  Outcome: Progressing  Flowsheets (Taken 3/22/2025 2025)  Discharge to home or other facility with appropriate resources: Identify barriers to discharge with patient and caregiver     Problem: Chronic Conditions and Co-morbidities  Goal: Patient's chronic conditions and co-morbidity symptoms are monitored and maintained or improved  Outcome: Adequate for Discharge  Flowsheets (Taken 3/23/2025 1002)  Care Plan - Patient's Chronic Conditions and Co-Morbidity Symptoms are Monitored and Maintained or Improved: Monitor and assess patient's chronic conditions and comorbid symptoms for stability, deterioration, or improvement     Problem: Skin/Tissue Integrity  Goal: Skin integrity remains intact  Description: 1.  Monitor for areas of redness and/or skin breakdown  2.  Assess vascular access sites hourly  3.  Every 4-6 hours minimum:  Change oxygen saturation probe site  4.  Every 4-6 hours:  If on nasal continuous positive airway pressure, respiratory therapy assess nares and determine need for appliance change or resting period  3/23/2025 1041 by Eleuterio Taylor RN  Outcome: Adequate for Discharge  Flowsheets (Taken 3/23/2025 1002)  Skin Integrity Remains Intact: Monitor for areas of redness and/or skin breakdown  3/23/2025 0117 by Cata Sexton RN  Outcome: Progressing  Flowsheets (Taken 3/22/2025 2025)  Skin Integrity Remains Intact: Monitor for areas of redness and/or skin breakdown     Problem: ABCDS Injury Assessment  Goal: Absence of physical injury  Outcome: Adequate for Discharge     Problem: Safety - Adult  Goal: Free from fall injury  Outcome: Adequate for Discharge     Problem: Pain  Goal: Verbalizes/displays adequate comfort level or baseline comfort level  Outcome: Adequate for Discharge

## 2025-03-24 LAB
EKG ATRIAL RATE: 92 BPM
EKG DIAGNOSIS: NORMAL
EKG P AXIS: 39 DEGREES
EKG P-R INTERVAL: 186 MS
EKG Q-T INTERVAL: 374 MS
EKG QRS DURATION: 118 MS
EKG QTC CALCULATION (BAZETT): 462 MS
EKG R AXIS: -60 DEGREES
EKG T AXIS: 35 DEGREES
EKG VENTRICULAR RATE: 92 BPM

## 2025-03-27 ENCOUNTER — FOLLOWUP TELEPHONE ENCOUNTER (OUTPATIENT)
Facility: HOSPITAL | Age: 85
End: 2025-03-27

## 2025-03-27 NOTE — TELEPHONE ENCOUNTER
RNMAMTA calls the pt and speaks to his spouse. PT is hard of hearing.    Pt just finshed seeing OT/PT today. PT is doing well and denies new/worsening symptoms at this time.

## 2025-04-07 ENCOUNTER — TRANSCRIBE ORDERS (OUTPATIENT)
Facility: HOSPITAL | Age: 85
End: 2025-04-07

## 2025-04-07 DIAGNOSIS — Z01.810 PRE-OPERATIVE CARDIOVASCULAR EXAMINATION: Primary | ICD-10-CM

## 2025-04-21 ENCOUNTER — HOSPITAL ENCOUNTER (OUTPATIENT)
Facility: HOSPITAL | Age: 85
Discharge: HOME | End: 2025-04-24
Attending: INTERNAL MEDICINE
Payer: MEDICARE

## 2025-04-21 ENCOUNTER — HOSPITAL ENCOUNTER (OUTPATIENT)
Facility: HOSPITAL | Age: 85
Discharge: HOME | End: 2025-04-23
Attending: INTERNAL MEDICINE
Payer: MEDICARE

## 2025-04-21 DIAGNOSIS — Z01.810 PRE-OPERATIVE CARDIOVASCULAR EXAMINATION: ICD-10-CM

## 2025-04-21 LAB
STRESS BASELINE DIAS BP: 83 MMHG
STRESS BASELINE HR: 89 BPM
STRESS BASELINE SYS BP: 188 MMHG
STRESS STAGE 1 BP: NORMAL MMHG
STRESS STAGE 1 DURATION: 1 MIN:SEC
STRESS STAGE 1 HR: 95 BPM
STRESS STAGE 2 DURATION: 2 MIN:SEC
STRESS STAGE 2 HR: 93 BPM
STRESS STAGE 3 BP: NORMAL MMHG
STRESS STAGE 3 DURATION: 3 MIN:SEC
STRESS STAGE 3 HR: 94 BPM
STRESS STAGE 4 DURATION: 4 MIN:SEC
STRESS STAGE 4 HR: 94 BPM
STRESS TARGET HR: 135 BPM

## 2025-04-21 PROCEDURE — 3430000000 HC RX DIAGNOSTIC RADIOPHARMACEUTICAL: Performed by: INTERNAL MEDICINE

## 2025-04-21 PROCEDURE — A9500 TC99M SESTAMIBI: HCPCS | Performed by: INTERNAL MEDICINE

## 2025-04-21 PROCEDURE — 6360000002 HC RX W HCPCS

## 2025-04-21 PROCEDURE — 78452 HT MUSCLE IMAGE SPECT MULT: CPT

## 2025-04-21 RX ORDER — TETRAKIS(2-METHOXYISOBUTYLISOCYANIDE)COPPER(I) TETRAFLUOROBORATE 1 MG/ML
9.76 INJECTION, POWDER, LYOPHILIZED, FOR SOLUTION INTRAVENOUS
Status: COMPLETED | OUTPATIENT
Start: 2025-04-21 | End: 2025-04-21

## 2025-04-21 RX ORDER — REGADENOSON 0.08 MG/ML
INJECTION, SOLUTION INTRAVENOUS
Status: COMPLETED
Start: 2025-04-21 | End: 2025-04-21

## 2025-04-21 RX ORDER — TETRAKIS(2-METHOXYISOBUTYLISOCYANIDE)COPPER(I) TETRAFLUOROBORATE 1 MG/ML
28.7 INJECTION, POWDER, LYOPHILIZED, FOR SOLUTION INTRAVENOUS
Status: COMPLETED | OUTPATIENT
Start: 2025-04-21 | End: 2025-04-21

## 2025-04-21 RX ADMIN — TETRAKIS(2-METHOXYISOBUTYLISOCYANIDE)COPPER(I) TETRAFLUOROBORATE 28.7 MILLICURIE: 1 INJECTION, POWDER, LYOPHILIZED, FOR SOLUTION INTRAVENOUS at 11:05

## 2025-04-21 RX ADMIN — REGADENOSON 0.4 MG: 0.08 INJECTION, SOLUTION INTRAVENOUS at 11:05

## 2025-04-21 RX ADMIN — TETRAKIS(2-METHOXYISOBUTYLISOCYANIDE)COPPER(I) TETRAFLUOROBORATE 9.76 MILLICURIE: 1 INJECTION, POWDER, LYOPHILIZED, FOR SOLUTION INTRAVENOUS at 09:20

## 2025-04-29 ENCOUNTER — HOSPITAL ENCOUNTER (INPATIENT)
Facility: HOSPITAL | Age: 85
LOS: 2 days | Discharge: HOME HEALTH | DRG: 377 | End: 2025-05-02
Attending: STUDENT IN AN ORGANIZED HEALTH CARE EDUCATION/TRAINING PROGRAM | Admitting: INTERNAL MEDICINE
Payer: MEDICARE

## 2025-04-29 ENCOUNTER — APPOINTMENT (OUTPATIENT)
Facility: HOSPITAL | Age: 85
DRG: 377 | End: 2025-04-29
Payer: MEDICARE

## 2025-04-29 DIAGNOSIS — R55 SYNCOPE AND COLLAPSE: Primary | ICD-10-CM

## 2025-04-29 DIAGNOSIS — K92.2 GASTROINTESTINAL HEMORRHAGE, UNSPECIFIED GASTROINTESTINAL HEMORRHAGE TYPE: ICD-10-CM

## 2025-04-29 LAB
ALBUMIN SERPL-MCNC: 3.1 G/DL (ref 3.5–5)
ALBUMIN SERPL-MCNC: 3.2 G/DL (ref 3.5–5)
ALBUMIN/GLOB SERPL: 1.2 (ref 1.1–2.2)
ALBUMIN/GLOB SERPL: 1.3 (ref 1.1–2.2)
ALP SERPL-CCNC: 87 U/L (ref 45–117)
ALP SERPL-CCNC: 93 U/L (ref 45–117)
ALT SERPL-CCNC: 16 U/L (ref 12–78)
ALT SERPL-CCNC: 19 U/L (ref 12–78)
ANION GAP SERPL CALC-SCNC: 7 MMOL/L (ref 2–12)
ANION GAP SERPL CALC-SCNC: 8 MMOL/L (ref 2–12)
AST SERPL W P-5'-P-CCNC: 16 U/L (ref 15–37)
AST SERPL W P-5'-P-CCNC: 54 U/L (ref 15–37)
BASOPHILS # BLD: 0.03 K/UL (ref 0–0.1)
BASOPHILS NFR BLD: 0.7 % (ref 0–1)
BILIRUB SERPL-MCNC: 0.4 MG/DL (ref 0.2–1)
BILIRUB SERPL-MCNC: 0.8 MG/DL (ref 0.2–1)
BUN SERPL-MCNC: 12 MG/DL (ref 6–20)
BUN SERPL-MCNC: 12 MG/DL (ref 6–20)
BUN/CREAT SERPL: 11 (ref 12–20)
BUN/CREAT SERPL: 13 (ref 12–20)
CA-I BLD-MCNC: 8.7 MG/DL (ref 8.5–10.1)
CA-I BLD-MCNC: 8.8 MG/DL (ref 8.5–10.1)
CHLORIDE SERPL-SCNC: 96 MMOL/L (ref 97–108)
CHLORIDE SERPL-SCNC: 96 MMOL/L (ref 97–108)
CO2 SERPL-SCNC: 23 MMOL/L (ref 21–32)
CO2 SERPL-SCNC: 28 MMOL/L (ref 21–32)
COLLECT DATE STL: ABNORMAL
CREAT SERPL-MCNC: 0.95 MG/DL (ref 0.7–1.3)
CREAT SERPL-MCNC: 1.05 MG/DL (ref 0.7–1.3)
DIFFERENTIAL METHOD BLD: ABNORMAL
EKG ATRIAL RATE: 91 BPM
EKG DIAGNOSIS: NORMAL
EKG P AXIS: 71 DEGREES
EKG P-R INTERVAL: 186 MS
EKG Q-T INTERVAL: 410 MS
EKG QRS DURATION: 128 MS
EKG QTC CALCULATION (BAZETT): 504 MS
EKG R AXIS: -74 DEGREES
EKG T AXIS: 58 DEGREES
EKG VENTRICULAR RATE: 91 BPM
EOSINOPHIL # BLD: 0.01 K/UL (ref 0–0.4)
EOSINOPHIL NFR BLD: 0.2 % (ref 0–7)
ERYTHROCYTE [DISTWIDTH] IN BLOOD BY AUTOMATED COUNT: 21.2 % (ref 11.5–14.5)
GLOBULIN SER CALC-MCNC: 2.5 G/DL (ref 2–4)
GLOBULIN SER CALC-MCNC: 2.5 G/DL (ref 2–4)
GLUCOSE SERPL-MCNC: 104 MG/DL (ref 65–100)
GLUCOSE SERPL-MCNC: 119 MG/DL (ref 65–100)
HCT VFR BLD AUTO: 26.4 % (ref 36.6–50.3)
HEMOCCULT SP1 STL QL: POSITIVE
HGB BLD-MCNC: 8 G/DL (ref 12.1–17)
IMM GRANULOCYTES # BLD AUTO: 0.02 K/UL (ref 0–0.04)
IMM GRANULOCYTES NFR BLD AUTO: 0.4 % (ref 0–0.5)
INR PPP: 1 (ref 0.9–1.1)
LYMPHOCYTES # BLD: 1.05 K/UL (ref 0.8–3.5)
LYMPHOCYTES NFR BLD: 22.8 % (ref 12–49)
MCH RBC QN AUTO: 23.1 PG (ref 26–34)
MCHC RBC AUTO-ENTMCNC: 30.3 G/DL (ref 30–36.5)
MCV RBC AUTO: 76.3 FL (ref 80–99)
MONOCYTES # BLD: 0.72 K/UL (ref 0–1)
MONOCYTES NFR BLD: 15.6 % (ref 5–13)
NEUTS SEG # BLD: 2.77 K/UL (ref 1.8–8)
NEUTS SEG NFR BLD: 60.3 % (ref 32–75)
NRBC # BLD: 0 K/UL (ref 0–0.01)
NRBC BLD-RTO: 0 PER 100 WBC
PLATELET # BLD AUTO: 266 K/UL (ref 150–400)
PMV BLD AUTO: 9.8 FL (ref 8.9–12.9)
POTASSIUM SERPL-SCNC: 3.9 MMOL/L (ref 3.5–5.1)
POTASSIUM SERPL-SCNC: 4.8 MMOL/L (ref 3.5–5.1)
PROT SERPL-MCNC: 5.6 G/DL (ref 6.4–8.2)
PROT SERPL-MCNC: 5.7 G/DL (ref 6.4–8.2)
PROTHROMBIN TIME: 13.7 SEC (ref 11.9–14.6)
RBC # BLD AUTO: 3.46 M/UL (ref 4.1–5.7)
RBC MORPH BLD: ABNORMAL
RBC MORPH BLD: ABNORMAL
SODIUM SERPL-SCNC: 127 MMOL/L (ref 136–145)
SODIUM SERPL-SCNC: 131 MMOL/L (ref 136–145)
TROPONIN I SERPL HS-MCNC: 14 NG/L (ref 0–76)
WBC # BLD AUTO: 4.6 K/UL (ref 4.1–11.1)

## 2025-04-29 PROCEDURE — 96366 THER/PROPH/DIAG IV INF ADDON: CPT

## 2025-04-29 PROCEDURE — 99285 EMERGENCY DEPT VISIT HI MDM: CPT

## 2025-04-29 PROCEDURE — 84484 ASSAY OF TROPONIN QUANT: CPT

## 2025-04-29 PROCEDURE — G0378 HOSPITAL OBSERVATION PER HR: HCPCS

## 2025-04-29 PROCEDURE — 94761 N-INVAS EAR/PLS OXIMETRY MLT: CPT

## 2025-04-29 PROCEDURE — 82272 OCCULT BLD FECES 1-3 TESTS: CPT

## 2025-04-29 PROCEDURE — 93005 ELECTROCARDIOGRAM TRACING: CPT | Performed by: STUDENT IN AN ORGANIZED HEALTH CARE EDUCATION/TRAINING PROGRAM

## 2025-04-29 PROCEDURE — 85610 PROTHROMBIN TIME: CPT

## 2025-04-29 PROCEDURE — 70450 CT HEAD/BRAIN W/O DYE: CPT

## 2025-04-29 PROCEDURE — 85025 COMPLETE CBC W/AUTO DIFF WBC: CPT

## 2025-04-29 PROCEDURE — 96374 THER/PROPH/DIAG INJ IV PUSH: CPT

## 2025-04-29 PROCEDURE — 71101 X-RAY EXAM UNILAT RIBS/CHEST: CPT

## 2025-04-29 PROCEDURE — 36415 COLL VENOUS BLD VENIPUNCTURE: CPT

## 2025-04-29 PROCEDURE — 6360000002 HC RX W HCPCS: Performed by: INTERNAL MEDICINE

## 2025-04-29 PROCEDURE — 83930 ASSAY OF BLOOD OSMOLALITY: CPT

## 2025-04-29 PROCEDURE — 73562 X-RAY EXAM OF KNEE 3: CPT

## 2025-04-29 PROCEDURE — 6370000000 HC RX 637 (ALT 250 FOR IP): Performed by: INTERNAL MEDICINE

## 2025-04-29 PROCEDURE — 96375 TX/PRO/DX INJ NEW DRUG ADDON: CPT

## 2025-04-29 PROCEDURE — 96365 THER/PROPH/DIAG IV INF INIT: CPT

## 2025-04-29 PROCEDURE — 73502 X-RAY EXAM HIP UNI 2-3 VIEWS: CPT

## 2025-04-29 PROCEDURE — 72125 CT NECK SPINE W/O DYE: CPT

## 2025-04-29 PROCEDURE — 71045 X-RAY EXAM CHEST 1 VIEW: CPT

## 2025-04-29 PROCEDURE — 2500000003 HC RX 250 WO HCPCS: Performed by: INTERNAL MEDICINE

## 2025-04-29 PROCEDURE — 2700000000 HC OXYGEN THERAPY PER DAY

## 2025-04-29 PROCEDURE — 6360000002 HC RX W HCPCS: Performed by: STUDENT IN AN ORGANIZED HEALTH CARE EDUCATION/TRAINING PROGRAM

## 2025-04-29 PROCEDURE — 80053 COMPREHEN METABOLIC PANEL: CPT

## 2025-04-29 RX ORDER — MAGNESIUM SULFATE IN WATER 40 MG/ML
2000 INJECTION, SOLUTION INTRAVENOUS PRN
Status: DISCONTINUED | OUTPATIENT
Start: 2025-04-29 | End: 2025-05-02 | Stop reason: HOSPADM

## 2025-04-29 RX ORDER — ONDANSETRON 2 MG/ML
4 INJECTION INTRAMUSCULAR; INTRAVENOUS EVERY 6 HOURS PRN
Status: DISCONTINUED | OUTPATIENT
Start: 2025-04-29 | End: 2025-04-29

## 2025-04-29 RX ORDER — FENTANYL CITRATE 50 UG/ML
25 INJECTION, SOLUTION INTRAMUSCULAR; INTRAVENOUS
Refills: 0 | Status: COMPLETED | OUTPATIENT
Start: 2025-04-29 | End: 2025-04-29

## 2025-04-29 RX ORDER — ATORVASTATIN CALCIUM 20 MG/1
20 TABLET, FILM COATED ORAL DAILY
Status: DISCONTINUED | OUTPATIENT
Start: 2025-04-29 | End: 2025-05-02 | Stop reason: HOSPADM

## 2025-04-29 RX ORDER — AMIODARONE HYDROCHLORIDE 200 MG/1
200 TABLET ORAL DAILY
Status: DISCONTINUED | OUTPATIENT
Start: 2025-04-29 | End: 2025-05-02 | Stop reason: HOSPADM

## 2025-04-29 RX ORDER — ACETAMINOPHEN 650 MG/1
650 SUPPOSITORY RECTAL EVERY 6 HOURS PRN
Status: DISCONTINUED | OUTPATIENT
Start: 2025-04-29 | End: 2025-05-02 | Stop reason: HOSPADM

## 2025-04-29 RX ORDER — POLYETHYLENE GLYCOL 3350 17 G/17G
17 POWDER, FOR SOLUTION ORAL DAILY PRN
Status: DISCONTINUED | OUTPATIENT
Start: 2025-04-29 | End: 2025-05-02 | Stop reason: HOSPADM

## 2025-04-29 RX ORDER — LOSARTAN POTASSIUM AND HYDROCHLOROTHIAZIDE 12.5; 1 MG/1; MG/1
1 TABLET ORAL DAILY
Status: ON HOLD | COMMUNITY
Start: 2025-04-10 | End: 2025-05-02 | Stop reason: HOSPADM

## 2025-04-29 RX ORDER — PREGABALIN 25 MG/1
25 CAPSULE ORAL 2 TIMES DAILY
Status: DISCONTINUED | OUTPATIENT
Start: 2025-04-29 | End: 2025-05-02 | Stop reason: HOSPADM

## 2025-04-29 RX ORDER — FUROSEMIDE 40 MG/1
20 TABLET ORAL DAILY
Status: DISCONTINUED | OUTPATIENT
Start: 2025-04-29 | End: 2025-05-02 | Stop reason: HOSPADM

## 2025-04-29 RX ORDER — POTASSIUM CHLORIDE 1500 MG/1
40 TABLET, EXTENDED RELEASE ORAL PRN
Status: DISCONTINUED | OUTPATIENT
Start: 2025-04-29 | End: 2025-05-02 | Stop reason: HOSPADM

## 2025-04-29 RX ORDER — PANTOPRAZOLE SODIUM 40 MG/1
40 TABLET, DELAYED RELEASE ORAL
Status: DISCONTINUED | OUTPATIENT
Start: 2025-04-30 | End: 2025-04-30

## 2025-04-29 RX ORDER — BUDESONIDE AND FORMOTEROL FUMARATE DIHYDRATE 160; 4.5 UG/1; UG/1
2 AEROSOL RESPIRATORY (INHALATION) 2 TIMES DAILY
Status: DISCONTINUED | OUTPATIENT
Start: 2025-04-30 | End: 2025-05-02 | Stop reason: HOSPADM

## 2025-04-29 RX ORDER — ALPRAZOLAM 0.25 MG
0.25 TABLET ORAL NIGHTLY PRN
Status: DISCONTINUED | OUTPATIENT
Start: 2025-04-29 | End: 2025-05-02 | Stop reason: HOSPADM

## 2025-04-29 RX ORDER — SODIUM CHLORIDE 9 MG/ML
INJECTION, SOLUTION INTRAVENOUS PRN
Status: DISCONTINUED | OUTPATIENT
Start: 2025-04-29 | End: 2025-05-02 | Stop reason: HOSPADM

## 2025-04-29 RX ORDER — ASPIRIN 81 MG/1
81 TABLET, CHEWABLE ORAL DAILY
Status: DISCONTINUED | OUTPATIENT
Start: 2025-04-30 | End: 2025-05-02 | Stop reason: HOSPADM

## 2025-04-29 RX ORDER — LIDOCAINE 4 G/G
1 PATCH TOPICAL DAILY
Status: DISCONTINUED | OUTPATIENT
Start: 2025-04-29 | End: 2025-05-02 | Stop reason: HOSPADM

## 2025-04-29 RX ORDER — TAMSULOSIN HYDROCHLORIDE 0.4 MG/1
0.4 CAPSULE ORAL DAILY
Status: DISCONTINUED | OUTPATIENT
Start: 2025-04-29 | End: 2025-05-02 | Stop reason: HOSPADM

## 2025-04-29 RX ORDER — SODIUM CHLORIDE 0.9 % (FLUSH) 0.9 %
5-40 SYRINGE (ML) INJECTION PRN
Status: DISCONTINUED | OUTPATIENT
Start: 2025-04-29 | End: 2025-05-02 | Stop reason: HOSPADM

## 2025-04-29 RX ORDER — SODIUM CHLORIDE 0.9 % (FLUSH) 0.9 %
5-40 SYRINGE (ML) INJECTION EVERY 12 HOURS SCHEDULED
Status: DISCONTINUED | OUTPATIENT
Start: 2025-04-29 | End: 2025-05-02 | Stop reason: HOSPADM

## 2025-04-29 RX ORDER — ALBUTEROL SULFATE 90 UG/1
2 INHALANT RESPIRATORY (INHALATION) EVERY 4 HOURS PRN
Status: DISCONTINUED | OUTPATIENT
Start: 2025-04-29 | End: 2025-05-02 | Stop reason: HOSPADM

## 2025-04-29 RX ORDER — ONDANSETRON 4 MG/1
4 TABLET, ORALLY DISINTEGRATING ORAL EVERY 8 HOURS PRN
Status: DISCONTINUED | OUTPATIENT
Start: 2025-04-29 | End: 2025-04-29

## 2025-04-29 RX ORDER — POTASSIUM CHLORIDE 7.45 MG/ML
10 INJECTION INTRAVENOUS PRN
Status: DISCONTINUED | OUTPATIENT
Start: 2025-04-29 | End: 2025-05-02 | Stop reason: HOSPADM

## 2025-04-29 RX ORDER — ACETAMINOPHEN 325 MG/1
650 TABLET ORAL EVERY 6 HOURS PRN
Status: DISCONTINUED | OUTPATIENT
Start: 2025-04-29 | End: 2025-05-02 | Stop reason: HOSPADM

## 2025-04-29 RX ORDER — FERROUS SULFATE 325(65) MG
325 TABLET ORAL
Status: DISCONTINUED | OUTPATIENT
Start: 2025-04-30 | End: 2025-05-02 | Stop reason: HOSPADM

## 2025-04-29 RX ORDER — BUDESONIDE AND FORMOTEROL FUMARATE DIHYDRATE 160; 4.5 UG/1; UG/1
2 AEROSOL RESPIRATORY (INHALATION) 2 TIMES DAILY
Status: DISCONTINUED | OUTPATIENT
Start: 2025-04-29 | End: 2025-04-29

## 2025-04-29 RX ORDER — CARVEDILOL 3.12 MG/1
6.25 TABLET ORAL 2 TIMES DAILY WITH MEALS
Status: DISCONTINUED | OUTPATIENT
Start: 2025-04-29 | End: 2025-05-02 | Stop reason: HOSPADM

## 2025-04-29 RX ORDER — ENOXAPARIN SODIUM 100 MG/ML
40 INJECTION SUBCUTANEOUS DAILY
Status: DISCONTINUED | OUTPATIENT
Start: 2025-04-30 | End: 2025-05-02 | Stop reason: HOSPADM

## 2025-04-29 RX ADMIN — SODIUM CHLORIDE, PRESERVATIVE FREE 10 ML: 5 INJECTION INTRAVENOUS at 19:17

## 2025-04-29 RX ADMIN — AMIODARONE HYDROCHLORIDE 200 MG: 200 TABLET ORAL at 16:28

## 2025-04-29 RX ADMIN — TAMSULOSIN HYDROCHLORIDE 0.4 MG: 0.4 CAPSULE ORAL at 16:28

## 2025-04-29 RX ADMIN — FUROSEMIDE 20 MG: 40 TABLET ORAL at 16:28

## 2025-04-29 RX ADMIN — MAGNESIUM SULFATE HEPTAHYDRATE 2000 MG: 40 INJECTION, SOLUTION INTRAVENOUS at 16:21

## 2025-04-29 RX ADMIN — CARVEDILOL 6.25 MG: 3.12 TABLET, FILM COATED ORAL at 16:39

## 2025-04-29 RX ADMIN — PREGABALIN 25 MG: 25 CAPSULE ORAL at 20:24

## 2025-04-29 RX ADMIN — CARVEDILOL 6.25 MG: 3.12 TABLET, FILM COATED ORAL at 16:38

## 2025-04-29 RX ADMIN — FENTANYL CITRATE 25 MCG: 50 INJECTION INTRAMUSCULAR; INTRAVENOUS at 09:09

## 2025-04-29 ASSESSMENT — PAIN SCALES - GENERAL
PAINLEVEL_OUTOF10: 0
PAINLEVEL_OUTOF10: 10
PAINLEVEL_OUTOF10: 8
PAINLEVEL_OUTOF10: 10

## 2025-04-29 ASSESSMENT — PAIN - FUNCTIONAL ASSESSMENT: PAIN_FUNCTIONAL_ASSESSMENT: 0-10

## 2025-04-29 NOTE — ED TRIAGE NOTES
Pt arrives via ems after sustaining a GLF.     +LOC. No blood thinners. + hit head.     C/o right side/rib pain, bilateral knee and wrist pain.     Arrives with c-collar in place.     Hx of AAA, abdominal stents. Chronic lower back pain also.     Pt usually walks with walker. States that he got up to use the restroom and woke up in the floor on top of his walker on his right side.

## 2025-04-29 NOTE — H&P
History & Physical    Primary Care Provider: Ray Salazar MD  Source of Information: Patient/family     Chief complaint: Syncope and Fall.    History of Presenting Illness:   Patient is a pleasant 85-year-old male, with a history of coronary artery disease, congestive heart failure, COPD, oxygen dependent at 2 L/min at night who presents to the hospital status post syncope and fall.  The patient states that he woke up in the morning to use the bathroom, stood up took a few steps and fell.  He admits to loss of consciousness which was brief according to the wife.  He is unaware of whether he hit his head or not but believes he did so.  He did sustain trauma to his bilateral knees, right hip and chest wall.  He denied any presyncopal symptoms such as lightheaded, dizziness, palpitations, chest pain.  No focal motor weakness comparable to his baseline.  Workup in the ER included CT of the head which did not show any acute process and radiological survey of the chest, hip, knees which did not reveal any acute fracture.  EKG was reviewed and showed a prolonged QT.  His only current complaint to me is soreness on his bilateral knees, ribs and hip.    Of note, patient was recently admitted and noted to be anemic status post transfusion of several units of blood.  He had follow-up with his cardiologist as an outpatient, underwent stress testing and was cleared for colonoscopy which they are in the process of scheduling.  He is notably anemic on presentation today, however is not too far off his last hemoglobin.  That being noted he does admit to dark stools, however he is on iron supplements.  Denies any recent diarrhea.  Is in fact constipated.    Admitting last night he did take a Xanax which he had been taking for the last couple of nights and is unsure if that contributed to his symptoms.  That being noted he did wake up on another instance prior to his fall to use the bathroom during which time he had no

## 2025-04-29 NOTE — ED PROVIDER NOTES
sounds.   Pulmonary:      Effort: Pulmonary effort is normal.      Breath sounds: Normal breath sounds.   Chest:       Abdominal:      General: Abdomen is flat. There is no distension.      Palpations: Abdomen is soft.      Tenderness: There is no abdominal tenderness.   Musculoskeletal:         General: Normal range of motion.      Cervical back: Normal range of motion.      Right lower leg: Edema present.      Comments: Tenderness to palpation over R trochanter   Skin:     General: Skin is warm.   Neurological:      General: No focal deficit present.      Mental Status: He is alert.      Motor: No weakness.         SCREENINGS                No data recorded       LAB, EKG AND DIAGNOSTIC RESULTS   Labs:  Recent Results (from the past 12 hours)   EKG 12 Lead    Collection Time: 04/29/25  8:08 AM   Result Value Ref Range    Ventricular Rate 91 BPM    Atrial Rate 91 BPM    P-R Interval 186 ms    QRS Duration 128 ms    Q-T Interval 410 ms    QTc Calculation (Bazett) 504 ms    P Axis 71 degrees    R Axis -74 degrees    T Axis 58 degrees    Diagnosis       Sinus rhythm with frequent Premature ventricular complexes  Left axis deviation  Non-specific intra-ventricular conduction block  Abnormal ECG  When compared with ECG of 29-APR-2025 08:08, (Unconfirmed)  No significant change was found  Confirmed by SOPHIE BECERRA MD ([id number]) on 4/29/2025 8:25:23 AM     CBC with Auto Differential    Collection Time: 04/29/25  8:21 AM   Result Value Ref Range    WBC 4.6 4.1 - 11.1 K/uL    RBC 3.46 (L) 4.10 - 5.70 M/uL    Hemoglobin 8.0 (L) 12.1 - 17.0 g/dL    Hematocrit 26.4 (L) 36.6 - 50.3 %    MCV 76.3 (L) 80.0 - 99.0 FL    MCH 23.1 (L) 26.0 - 34.0 PG    MCHC 30.3 30.0 - 36.5 g/dL    RDW 21.2 (H) 11.5 - 14.5 %    Platelets 266 150 - 400 K/uL    MPV 9.8 8.9 - 12.9 FL    Nucleated RBCs 0.0 0.0  WBC    nRBC 0.00 0.00 - 0.01 K/uL    Neutrophils % 60.3 32.0 - 75.0 %    Lymphocytes % 22.8 12.0 - 49.0 %    Monocytes % 15.6 (H) 5.0 -

## 2025-04-29 NOTE — PROGRESS NOTES
Received Order for Telemetry     Van RAMIREZ Khanh Lechuga   1940   [id number]   Syncope and collapse [R55]   Priscilla Munoz MD     Tele Box # [phone] placed on patient at  1257 pm  ER Room # 7  Admitting to Room 466  Transferring Nurse ramsey  Verified with Primary Nurse jacob at  1418 pm

## 2025-04-29 NOTE — CARE COORDINATION
04/29/25 1144   Service Assessment   Patient Orientation Alert and Oriented   Cognition Alert   History Provided By Patient   Primary Caregiver Spouse   Support Systems Family Members;Spouse/Significant Other   Patient's Healthcare Decision Maker is: Named in Scanned ACP Document   PCP Verified by CM Yes  (Facesheet)   Last Visit to PCP Within last 3 months   Prior Functional Level Independent in ADLs/IADLs   Current Functional Level Independent in ADLs/IADLs   Can patient return to prior living arrangement Yes   Ability to make needs known: Good   Family able to assist with home care needs: Yes   Would you like for me to discuss the discharge plan with any other family members/significant others, and if so, who? Yes   Financial Resources None   Community Resources None   Social/Functional History   Lives With Spouse   Type of Home House   Home Layout One level   Home Access Stairs to enter with rails   Entrance Stairs - Number of Steps 5 steps   Entrance Stairs - Rails Left   Bathroom Shower/Tub Tub/Shower unit   Bathroom Toilet Standard   Bathroom Equipment Grab bars in shower   Bathroom Accessibility Accessible   Home Equipment Cane - Quad;Walker - Standard   Receives Help From Family   Prior Level of Assist for ADLs Independent   Prior Level of Assist for Homemaking Independent   Homemaking Responsibilities No   Ambulation Assistance Independent   Prior Level of Assist for Transfers Independent   Active  No   Mode of Transportation Car   Occupation Retired   Discharge Planning   Type of Residence House   Living Arrangements Spouse/Significant Other   Current Services Prior To Admission Home Care  (Unable to remember home health name)   Type of Home Care Services None   Patient expects to be discharged to: House   One/Two Story Residence One story   History of falls? 1   Services At/After Discharge   Services At/After Discharge None   Confirm Follow Up Transport Family         Advance Care Planning

## 2025-04-29 NOTE — CONSULTS
Cardiology Consult    NAME: Van Cassidy Jr.   :  1940   MRN:  [id number]     Date/Time:  2025 1:19 PM    Patient PCP: Ray Salazar MD  ________________________________________________________________________     Assessment/Plan:   Fall/syncope.  Patient denies knowledge of any symptoms prior to.  Got up to use the bathroom and had taken a few steps before he fell and syncopized.  Continue telemetry.  3/25 echo with normal LV function.   Cardiolite with inferior infarct/scar without significant reversibility.    GI bleed/anemia, pending colonoscopy.  Continues to be anemic though hemoglobin has been stable since discharge.  Reports black stools and patient on iron.    Coronary artery disease, status post CABG .  Currently without chest pain, negative troponin, unremarkable EKG.    COPD, on home oxygen, mostly uses it during sleep.      History of nonsustained VT, continues amiodarone.    Hypertension, without hypotension.  Continue home antihypertensives.      History of AAA stenting.           []        High complexity decision making was performed        Subjective:   CHIEF COMPLAINT:     Fall/syncope  REASON FOR CONSULT:    Fall/syncope.  Anemia,?  For colonoscopy  HISTORY OF PRESENT ILLNESS:     Van Cassidy Jr. is a 85 y.o. White (non-) male who presents with fall and loss of consciousness.  Without chest pain.  Some shortness of breath.  Nodule feels cold.  History of coronary artery disease status post CABG without chest pain.  Normal LV function by recent echo.  Negative Cardiolite stress test but for inferior scar.  On home oxygen 2 L.  Walks at home with a walker.      Past Medical History:   Diagnosis Date    High cholesterol     HTN (hypertension)       Past Surgical History:   Procedure Laterality Date    ABDOMINAL AORTIC ANEURYSM REPAIR      OTHER SURGICAL HISTORY      open heart     Allergies   Allergen Reactions    Codeine Hallucinations      Meds:  See

## 2025-04-29 NOTE — ED NOTES
ED TO INPATIENT SBAR HANDOFF    Patient Name: Van Cassidy Jr.   Preferred Name: Van  : 1940  85 y.o.   Family/Caregiver Present: no   Code Status Order: DNR  PO Status: NPO:No  Telemetry Order: Yes  C-SSRS: Risk of Suicide: No Risk  Sitter no     Restraints:     Sepsis Risk Score Sepsis V2 Risk Score: 41.6    Situation  Chief Complaint   Patient presents with    Fall     Brief Description of Patient's Condition: Pt arrived via ems from home after sustaining a GLF this morning. Pt states that he was getting up to go to the bathroom when he woke up in the floor on top of his walker. Arrived with c/o right sided rib pain, bilateral wrist, and knee pain. C-collar was in place on arrival, removed after CT scan by Dr. Chi. Placed on 2L of O2 for respiratory rate. Wears O2 at home at night. Being admitted under Dr. Munoz for syncope and collapse. Pt is hard of hearing, if you stand on the right side and speak he has a better time understanding.   Mental Status: oriented and alert  Arrived from:Home  Imaging:   XR HIP 2-3 VW W PELVIS RIGHT   Final Result   No acute abnormality.      Electronically signed by Maribeth Jackson      XR KNEE RIGHT (3 VIEWS)   Final Result   Right chondromalacia patella.      Electronically signed by Maribeth Renetta      XR RIBS RIGHT INCLUDE CHEST (MIN 3 VIEWS)   Final Result   1. No apparent right rib fracture.   2. No acute cardiopulmonary disease.      Electronically signed by SUKHWINDER PACMOHINI      CT CSpine W/O Contrast   Final Result   No fracture.         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   No evidence for acute intracranial hemorrhage.            Electronically signed by Maribeth Jackson      XR CHEST 1 VIEW   Final Result      No acute pulmonary process.          Electronically signed by Dawson Russell      Vascular duplex carotid bilateral    (Results Pending)     Abnormal labs:

## 2025-04-30 ENCOUNTER — APPOINTMENT (OUTPATIENT)
Facility: HOSPITAL | Age: 85
DRG: 377 | End: 2025-04-30
Attending: INTERNAL MEDICINE
Payer: MEDICARE

## 2025-04-30 PROBLEM — K92.2 GI BLEED: Status: ACTIVE | Noted: 2025-04-30

## 2025-04-30 LAB
ALBUMIN SERPL-MCNC: 3.1 G/DL (ref 3.5–5)
ALBUMIN/GLOB SERPL: 1.1 (ref 1.1–2.2)
ALP SERPL-CCNC: 90 U/L (ref 45–117)
ALT SERPL-CCNC: 15 U/L (ref 12–78)
ANION GAP SERPL CALC-SCNC: 5 MMOL/L (ref 2–12)
AST SERPL W P-5'-P-CCNC: 17 U/L (ref 15–37)
BASOPHILS # BLD: 0.03 K/UL (ref 0–0.1)
BASOPHILS NFR BLD: 0.6 % (ref 0–1)
BILIRUB SERPL-MCNC: 0.5 MG/DL (ref 0.2–1)
BUN SERPL-MCNC: 13 MG/DL (ref 6–20)
BUN/CREAT SERPL: 14 (ref 12–20)
CA-I BLD-MCNC: 8.6 MG/DL (ref 8.5–10.1)
CHLORIDE SERPL-SCNC: 98 MMOL/L (ref 97–108)
CO2 SERPL-SCNC: 28 MMOL/L (ref 21–32)
CREAT SERPL-MCNC: 0.94 MG/DL (ref 0.7–1.3)
DIFFERENTIAL METHOD BLD: ABNORMAL
EOSINOPHIL # BLD: 0.02 K/UL (ref 0–0.4)
EOSINOPHIL NFR BLD: 0.4 % (ref 0–7)
ERYTHROCYTE [DISTWIDTH] IN BLOOD BY AUTOMATED COUNT: 21.5 % (ref 11.5–14.5)
GLOBULIN SER CALC-MCNC: 2.7 G/DL (ref 2–4)
GLUCOSE SERPL-MCNC: 103 MG/DL (ref 65–100)
HCT VFR BLD AUTO: 27.3 % (ref 36.6–50.3)
HCT VFR BLD AUTO: 27.6 % (ref 36.6–50.3)
HGB BLD-MCNC: 8.1 G/DL (ref 12.1–17)
HGB BLD-MCNC: 8.2 G/DL (ref 12.1–17)
IMM GRANULOCYTES # BLD AUTO: 0.01 K/UL (ref 0–0.04)
IMM GRANULOCYTES NFR BLD AUTO: 0.2 % (ref 0–0.5)
LYMPHOCYTES # BLD: 0.77 K/UL (ref 0.8–3.5)
LYMPHOCYTES NFR BLD: 15.7 % (ref 12–49)
MCH RBC QN AUTO: 23.2 PG (ref 26–34)
MCHC RBC AUTO-ENTMCNC: 29.7 G/DL (ref 30–36.5)
MCV RBC AUTO: 78 FL (ref 80–99)
MONOCYTES # BLD: 0.76 K/UL (ref 0–1)
MONOCYTES NFR BLD: 15.5 % (ref 5–13)
NEUTS SEG # BLD: 3.31 K/UL (ref 1.8–8)
NEUTS SEG NFR BLD: 67.6 % (ref 32–75)
NRBC # BLD: 0 K/UL (ref 0–0.01)
NRBC BLD-RTO: 0 PER 100 WBC
OSMOLALITY SERPL: 270 MOSM/KG H2O
PLATELET # BLD AUTO: 259 K/UL (ref 150–400)
PMV BLD AUTO: 9.8 FL (ref 8.9–12.9)
POTASSIUM SERPL-SCNC: 3.9 MMOL/L (ref 3.5–5.1)
PROT SERPL-MCNC: 5.8 G/DL (ref 6.4–8.2)
RBC # BLD AUTO: 3.54 M/UL (ref 4.1–5.7)
RBC MORPH BLD: ABNORMAL
SODIUM SERPL-SCNC: 131 MMOL/L (ref 136–145)
TSH SERPL DL<=0.05 MIU/L-ACNC: 1.7 UIU/ML (ref 0.36–3.74)
WBC # BLD AUTO: 4.9 K/UL (ref 4.1–11.1)

## 2025-04-30 PROCEDURE — 85014 HEMATOCRIT: CPT

## 2025-04-30 PROCEDURE — 6370000000 HC RX 637 (ALT 250 FOR IP): Performed by: INTERNAL MEDICINE

## 2025-04-30 PROCEDURE — 6360000002 HC RX W HCPCS: Performed by: INTERNAL MEDICINE

## 2025-04-30 PROCEDURE — 97162 PT EVAL MOD COMPLEX 30 MIN: CPT

## 2025-04-30 PROCEDURE — 80053 COMPREHEN METABOLIC PANEL: CPT

## 2025-04-30 PROCEDURE — 97530 THERAPEUTIC ACTIVITIES: CPT

## 2025-04-30 PROCEDURE — 85025 COMPLETE CBC W/AUTO DIFF WBC: CPT

## 2025-04-30 PROCEDURE — 97535 SELF CARE MNGMENT TRAINING: CPT

## 2025-04-30 PROCEDURE — 94640 AIRWAY INHALATION TREATMENT: CPT

## 2025-04-30 PROCEDURE — 94761 N-INVAS EAR/PLS OXIMETRY MLT: CPT

## 2025-04-30 PROCEDURE — 2700000000 HC OXYGEN THERAPY PER DAY

## 2025-04-30 PROCEDURE — 96372 THER/PROPH/DIAG INJ SC/IM: CPT

## 2025-04-30 PROCEDURE — 97116 GAIT TRAINING THERAPY: CPT

## 2025-04-30 PROCEDURE — G0378 HOSPITAL OBSERVATION PER HR: HCPCS

## 2025-04-30 PROCEDURE — 85018 HEMOGLOBIN: CPT

## 2025-04-30 PROCEDURE — 2500000003 HC RX 250 WO HCPCS: Performed by: INTERNAL MEDICINE

## 2025-04-30 PROCEDURE — 36415 COLL VENOUS BLD VENIPUNCTURE: CPT

## 2025-04-30 PROCEDURE — 1100000000 HC RM PRIVATE

## 2025-04-30 PROCEDURE — 84443 ASSAY THYROID STIM HORMONE: CPT

## 2025-04-30 PROCEDURE — 93880 EXTRACRANIAL BILAT STUDY: CPT

## 2025-04-30 PROCEDURE — 97165 OT EVAL LOW COMPLEX 30 MIN: CPT

## 2025-04-30 RX ORDER — PANTOPRAZOLE SODIUM 40 MG/10ML
40 INJECTION, POWDER, LYOPHILIZED, FOR SOLUTION INTRAVENOUS 2 TIMES DAILY
Status: DISCONTINUED | OUTPATIENT
Start: 2025-04-30 | End: 2025-05-02 | Stop reason: HOSPADM

## 2025-04-30 RX ADMIN — ATORVASTATIN CALCIUM 20 MG: 20 TABLET, FILM COATED ORAL at 08:38

## 2025-04-30 RX ADMIN — TAMSULOSIN HYDROCHLORIDE 0.4 MG: 0.4 CAPSULE ORAL at 08:38

## 2025-04-30 RX ADMIN — ALPRAZOLAM 0.25 MG: 0.25 TABLET ORAL at 20:47

## 2025-04-30 RX ADMIN — FUROSEMIDE 20 MG: 40 TABLET ORAL at 08:46

## 2025-04-30 RX ADMIN — ASPIRIN 81 MG: 81 TABLET, CHEWABLE ORAL at 08:37

## 2025-04-30 RX ADMIN — SODIUM CHLORIDE, PRESERVATIVE FREE 10 ML: 5 INJECTION INTRAVENOUS at 08:49

## 2025-04-30 RX ADMIN — PREGABALIN 25 MG: 25 CAPSULE ORAL at 20:47

## 2025-04-30 RX ADMIN — SODIUM CHLORIDE, PRESERVATIVE FREE 10 ML: 5 INJECTION INTRAVENOUS at 19:22

## 2025-04-30 RX ADMIN — Medication 2 PUFF: at 08:42

## 2025-04-30 RX ADMIN — AMIODARONE HYDROCHLORIDE 200 MG: 200 TABLET ORAL at 08:37

## 2025-04-30 RX ADMIN — PANTOPRAZOLE SODIUM 40 MG: 40 TABLET, DELAYED RELEASE ORAL at 05:03

## 2025-04-30 RX ADMIN — CARVEDILOL 6.25 MG: 3.12 TABLET, FILM COATED ORAL at 08:37

## 2025-04-30 RX ADMIN — PREGABALIN 25 MG: 25 CAPSULE ORAL at 08:37

## 2025-04-30 RX ADMIN — PANTOPRAZOLE SODIUM 40 MG: 40 INJECTION, POWDER, FOR SOLUTION INTRAVENOUS at 09:45

## 2025-04-30 RX ADMIN — ENOXAPARIN SODIUM 40 MG: 100 INJECTION SUBCUTANEOUS at 08:38

## 2025-04-30 RX ADMIN — CARVEDILOL 6.25 MG: 3.12 TABLET, FILM COATED ORAL at 17:14

## 2025-04-30 RX ADMIN — FERROUS SULFATE TAB 325 MG (65 MG ELEMENTAL FE) 325 MG: 325 (65 FE) TAB at 08:38

## 2025-04-30 RX ADMIN — PANTOPRAZOLE SODIUM 40 MG: 40 INJECTION, POWDER, FOR SOLUTION INTRAVENOUS at 20:01

## 2025-04-30 RX ADMIN — Medication 2 PUFF: at 19:30

## 2025-04-30 ASSESSMENT — PAIN SCALES - GENERAL
PAINLEVEL_OUTOF10: 0
PAINLEVEL_OUTOF10: 0

## 2025-04-30 NOTE — CONSULTS
Gastroenterology Consult     Referring Physician: Ray Salazar MD     Consult Date: 4/30/2025     Subjective:     Patient was admitted to the hospital with syncopal episode and anemia.  Patient was supposed to have a colonoscopy as an outpatient however prior to the schedule patient ended up in the hospital with syncopal episodes he still feels lightheaded.  He denies any overt bleeding he was taking aspirin which has since been stopped.  A consult was placed to evaluate for gastrointestinal blood loss patient has already had his breakfast today    Past Medical History:   Diagnosis Date    High cholesterol     HTN (hypertension)      Past Surgical History:   Procedure Laterality Date    ABDOMINAL AORTIC ANEURYSM REPAIR      OTHER SURGICAL HISTORY      open heart      No family history on file.  Social History     Tobacco Use    Smoking status: Former    Smokeless tobacco: Never   Substance Use Topics    Alcohol use: Not on file      Allergies   Allergen Reactions    Codeine Hallucinations     Current Facility-Administered Medications   Medication Dose Route Frequency    pantoprazole (PROTONIX) injection 40 mg  40 mg IntraVENous BID    sodium chloride flush 0.9 % injection 5-40 mL  5-40 mL IntraVENous 2 times per day    sodium chloride flush 0.9 % injection 5-40 mL  5-40 mL IntraVENous PRN    0.9 % sodium chloride infusion   IntraVENous PRN    potassium chloride (KLOR-CON M) extended release tablet 40 mEq  40 mEq Oral PRN    Or    potassium bicarb-citric acid (EFFER-K) effervescent tablet 40 mEq  40 mEq Oral PRN    Or    potassium chloride 10 mEq/100 mL IVPB (Peripheral Line)  10 mEq IntraVENous PRN    magnesium sulfate 2000 mg in 50 mL IVPB premix  2,000 mg IntraVENous PRN    [Held by provider] enoxaparin (LOVENOX) injection 40 mg  40 mg SubCUTAneous Daily    polyethylene glycol (GLYCOLAX) packet 17 g  17 g Oral Daily PRN    acetaminophen (TYLENOL) tablet 650 mg  650 mg Oral Q6H PRN    Or    acetaminophen

## 2025-04-30 NOTE — PROGRESS NOTES
Progress Note      4/30/2025 4:21 PM  NAME: Van Cassidy Jr.   MRN:  [id number]   Admit Diagnosis: Syncope and collapse [R55]  GI bleed [K92.2]          Assessment/Plan:   Fall/syncope, has been out of bed, no significant arrhythmias seen.  Normal LV function 3/25 by echo    GI bleed/anemia, for endoscopy in a.m. agreed to proceed    Chronic anemia, stable hemoglobin    COPD, on home oxygen    Nonsustained VT: Continues amiodarone.  Chest x-ray without acute pathology.  Normal AST, ALT.  Check TSH.    History of AAA stenting            Subjective:     Van Cassidy Jr. denies chest pain, dyspnea.  Discussed with RN events overnight.     Review of Systems:    Symptom Y/N Comments  Symptom Y/N Comments   Fever/Chills N   Chest Pain N    Poor Appetite N   Edema N    Cough N   Abdominal Pain N    Sputum N   Joint Pain N    SOB/BROWN N   Pruritis/Rash N    Nausea/vomit N   Tolerating PT/OT Y    Diarrhea N   Tolerating Diet Y    Constipation N   Other       Could NOT obtain due to:      Objective:      Physical Exam:    Last 24hrs VS reviewed since prior progress note. Most recent are:    BP (!) 119/54   Pulse 76   Temp 97.9 °F (36.6 °C) (Oral)   Resp 17   Ht 1.778 m (5' 10\")   Wt 88.8 kg (195 lb 12.3 oz)   SpO2 98%   BMI 28.09 kg/m²     Intake/Output Summary (Last 24 hours) at 4/30/2025 1621  Last data filed at 4/29/2025 2027  Gross per 24 hour   Intake 550 ml   Output --   Net 550 ml        General Appearance: Elderly male, overweight, alert; no acute distress.  Ears/Nose/Mouth/Throat: Hearing grossly normal; moist mucous membranes  Neck: Supple.  Chest: Lungs clear to auscultation bilaterally.  Cardiovascular: Regular rate and rhythm, S1S2 normal, no murmur.  Abdomen: Soft, non-tender, bowel sounds are active.  Extremities: No edema bilaterally.  Skin: Warm and dry.    []         Post-cath site without hematoma, bruit, tenderness, or thrill.  Distal pulses intact.    PMH/SH reviewed - no change compared to

## 2025-04-30 NOTE — PLAN OF CARE
PHYSICAL THERAPY EVALUATION  Patient: Van Cassidy Jr. (85 y.o. male)  Date: 4/30/2025  Primary Diagnosis: Syncope and collapse [R55]  GI bleed [K92.2]       Precautions: Fall Risk, General Precautions                      Recommendations for nursing mobility: Out of bed to chair for meals, Frequent repositioning to prevent skin breakdown, Use of BSC for toileting , and Assist x1    In place during session: Nasal Cannula 2L, External Catheter, and EKG/telemetry     ASSESSMENT  Pt is a 85 y.o. male admitted on 4/29/2025 for syncope and fall; pt currently being treated for syncope and collaps, anemia of chronic disease, GI bleed, HTN, hyperlipidemia, h/o BPH, peripheral neuropathy . Pt lying in semisupine position upon PT arrival, agreeable to evaluation. Pt A&O x 4.  Patient's wife present for evaluation. Patient is [place] and prefers to be spoken to in R ear.     Based on the objective data described below, the patient currently presents with impaired functional mobility, decreased activity tolerance, impaired balance, and orthostatic hypotension. (See below for objective details and assist levels).     Overall pt tolerated session fair today with PT evaluation.  Pt required sba for bed mobility. Orthostatic BP's taken with results below. Patient does display orthostatic hypotension.  Patient with no sign/symptoms of orthostatic BP. RN made aware. Patient sat at eob with good static and dynamic sit balance. Patient transfer sit<>stand with cga. Spoke with RN who stated that it was ok to transfer patient to a recliner. This writer did not feel comfortable ambulating patient today as patient was orthostatic and did have a syncope episode last night. Patient transfer to supine and back to sit on opposite side of bed. Patient transfer to recliner with rw. Pt amb ~2 sidesteps  with rw, gt belt and cga to recliner; demonstrates slow mike with decrease step length B. Patient left up in recliner with all needs met and

## 2025-04-30 NOTE — PROGRESS NOTES
4 Eyes Skin Assessment     NAME:  Van Cassidy Jr.  YOB: 1940  MEDICAL RECORD NUMBER:  [id number]    The patient is being assessed for  Other weekly assessment     I agree that at least one RN has performed a thorough Head to Toe Skin Assessment on the patient. ALL assessment sites listed below have been assessed.      Areas assessed by both nurses:    Head, Face, Ears, Shoulders, Back, Chest, Arms, Elbows, Hands, Sacrum. Buttock, Coccyx, Ischium, Legs. Feet and Heels, and Under Medical Devices         Does the Patient have a Wound? No noted wound(s)       Jesus Prevention initiated by RN: Yes  Wound Care Orders initiated by RN: No    Pressure Injury (Stage 3,4, Unstageable, DTI, NWPT, and Complex wounds) if present, place Wound referral order by RN under : No    New Ostomies, if present place, Ostomy referral order under : No     Nurse 1 eSignature: Electronically signed by Zena Saldana RN on 4/30/25 at 3:10 PM EDT    **SHARE this note so that the co-signing nurse can place an eSignature**    Nurse 2 eSignature: Electronically signed by Mignon Jimenez RN on 4/30/25 at 3:12 PM EDT

## 2025-04-30 NOTE — PLAN OF CARE
Problem: Chronic Conditions and Co-morbidities  Goal: Patient's chronic conditions and co-morbidity symptoms are monitored and maintained or improved  Outcome: Progressing  Flowsheets (Taken 4/30/2025 0748)  Care Plan - Patient's Chronic Conditions and Co-Morbidity Symptoms are Monitored and Maintained or Improved:   Collaborate with multidisciplinary team to address chronic and comorbid conditions and prevent exacerbation or deterioration   Monitor and assess patient's chronic conditions and comorbid symptoms for stability, deterioration, or improvement   Update acute care plan with appropriate goals if chronic or comorbid symptoms are exacerbated and prevent overall improvement and discharge     Problem: Discharge Planning  Goal: Discharge to home or other facility with appropriate resources  Outcome: Progressing  Flowsheets (Taken 4/30/2025 0748)  Discharge to home or other facility with appropriate resources:   Identify barriers to discharge with patient and caregiver   Arrange for needed discharge resources and transportation as appropriate   Identify discharge learning needs (meds, wound care, etc)   Arrange for interpreters to assist at discharge as needed     Problem: Pain  Goal: Verbalizes/displays adequate comfort level or baseline comfort level  Outcome: Progressing     Problem: Skin/Tissue Integrity  Goal: Skin integrity remains intact  Description: 1.  Monitor for areas of redness and/or skin breakdown2.  Assess vascular access sites hourly3.  Every 4-6 hours minimum:  Change oxygen saturation probe site4.  Every 4-6 hours:  If on nasal continuous positive airway pressure, respiratory therapy assess nares and determine need for appliance change or resting period  Outcome: Progressing  Flowsheets (Taken 4/30/2025 0748)  Skin Integrity Remains Intact:   Monitor for areas of redness and/or skin breakdown   Assess vascular access sites hourly   Every 4-6 hours minimum:  Change oxygen saturation probe

## 2025-04-30 NOTE — PROGRESS NOTES
Patient usually lives at home with his wife and has HH services and nocturnal oxygen.     Patient fell yesterday. CM will await PT/OT evals to determine safest discharge plan.     Followed by cardiology and GI for recent GIB.     Patient flipped to inpatient from obs this am.

## 2025-04-30 NOTE — PLAN OF CARE
Problem: Discharge Planning  Goal: Discharge to home or other facility with appropriate resources  4/30/2025 1921 by Estella Hernandez RN  Outcome: Progressing  4/30/2025 1029 by Zena Saldana RN  Outcome: Progressing  Flowsheets (Taken 4/30/2025 0748)  Discharge to home or other facility with appropriate resources:   Identify barriers to discharge with patient and caregiver   Arrange for needed discharge resources and transportation as appropriate   Identify discharge learning needs (meds, wound care, etc)   Arrange for interpreters to assist at discharge as needed     Problem: Chronic Conditions and Co-morbidities  Goal: Patient's chronic conditions and co-morbidity symptoms are monitored and maintained or improved  4/30/2025 1921 by Estella Hernandez RN  Outcome: Progressing  4/30/2025 1029 by Zena Saldana RN  Outcome: Progressing  Flowsheets (Taken 4/30/2025 0748)  Care Plan - Patient's Chronic Conditions and Co-Morbidity Symptoms are Monitored and Maintained or Improved:   Collaborate with multidisciplinary team to address chronic and comorbid conditions and prevent exacerbation or deterioration   Monitor and assess patient's chronic conditions and comorbid symptoms for stability, deterioration, or improvement   Update acute care plan with appropriate goals if chronic or comorbid symptoms are exacerbated and prevent overall improvement and discharge     Problem: Pain  Goal: Verbalizes/displays adequate comfort level or baseline comfort level  4/30/2025 1921 by Estella Hernandez, RN  Outcome: Progressing  4/30/2025 1029 by Zena Saldana RN  Outcome: Progressing     Problem: Skin/Tissue Integrity  Goal: Skin integrity remains intact  Description: 1.  Monitor for areas of redness and/or skin breakdown2.  Assess vascular access sites hourly3.  Every 4-6 hours minimum:  Change oxygen saturation probe site4.  Every 4-6 hours:  If on nasal continuous positive airway pressure,

## 2025-04-30 NOTE — PROGRESS NOTES
Hospitalist Progress Note    NAME: Van Cassidy Jr.   :  1940   MRN:  [id number]            Subjective:     Chief Complaint / Reason for Physician Visit Weakness/fall  Patient seen and evaluated at bedside, overnight events reviewed, patient currently has no new complaints.  Discussed with RN events overnight.     Review of Systems:  Symptom Y/N Comments  Symptom Y/N Comments   Fever/Chills N   Chest Pain N    Poor Appetite Y   Edema N    Cough N   Abdominal Pain Y    Sputum N   Joint Pain N    SOB/BROWN N   Pruritis/Rash N    Nausea/vomit N   Tolerating PT/OT NA    Diarrhea N   Tolerating Diet Y    Constipation N   Other       Could NOT obtain due to:      Objective:     VITALS:   Last 24hrs VS reviewed since prior progress note. Most recent are:  [unfilled]    Intake/Output Summary (Last 24 hours) at 2025 1055  Last data filed at 2025  Gross per 24 hour   Intake 550 ml   Output --   Net 550 ml        PHYSICAL EXAM:  General: Patient appears comfortable  EENT:  EOMI. Anicteric sclerae. MMM  Resp:  CTA bilaterally, no wheezing or rales.  No accessory muscle use  CV:  Regular  rhythm, s1/s2 no m/r/g No edema  GI:  Soft, Non distended, Non tender.  +Bowel sounds  Neurologic:  Alert and oriented X 3, normal speech,   Psych:   Good insight. Not anxious nor agitated  Skin:  No rashes.  No jaundice    Procedures: see electronic medical records for all procedures/Xrays and details which were not copied into this note but were reviewed prior to creation of Plan.      LABS:  I reviewed today's most current labs and imaging studies.  Pertinent labs include:  Recent Labs     25  0821 25  0309   WBC 4.6 4.9   HGB 8.0* 8.2*   HCT 26.4* 27.6*    259     Recent Labs     25  0821 25  1742 25  0309   * 131* 131*   K 4.8 3.9 3.9   CL 96* 96* 98   CO2 23 28 28   BUN 12 12 13   ALT 16 19 15   INR 1.0  --   --        Signed: Agustín Stack MD    Medical Decision

## 2025-04-30 NOTE — THERAPY EVALUATION
OCCUPATIONAL THERAPY EVALUATION  Patient: Van Cassidy Jr. (85 y.o. male)  Date: 4/30/2025  Primary Diagnosis: Syncope and collapse [R55]  GI bleed [K92.2]       Precautions: Bed Alarm, Fall Risk                Recommendations for nursing mobility: Encourage HEP in prep for ADLs/mobility; see handout for details, Frequent repositioning to prevent skin breakdown, Use of bed/chair alarm for safety, Use of BSC for toileting , and Assist x1    In place during session:EKG/telemetry   ASSESSMENT  Pt is a 85 y.o. male presenting to [facility] with c/o syncope and fall, admitted 4/29/25 and currently being treated for syncope and collapse, anemia, GIB, HTN, peripheral neuropathy. Pt received semi-supine in bed upon arrival, AXO x 4, and agreeable to OT evaluation.     Based on current observations, pt presents with decreased  functional mobility, independence in ADLs, high-level IADLs, strength, body mechanics, activity tolerance, endurance, balance (see below for objective details and assist levels).     Overall, pt tolerates session fair with no c/o pain. Pt completed bed mobility and sit<>stand transfers with SBA-CGA overall in prep for toilet transfers. LB dressing completed with SBA while seated EOB and grooming completed with set up A while supine in bed, see details below. Pt SOB with activity, SpO2 WFL, see below. BP monitored with positional changes s/t RN reports of syncope with OOB activity, see below. OOB activity deferred d/t low BP reading with standing and pt reporting spontaneous syncope with RN's the night before when ambulating to the bathroom. Pt will benefit from continued skilled OT services to address current impairments and improve IND and safety with self cares and functional transfers/mobility. Current OT d/c recommendation Continue to assess pending progress once medically appropriate.     Start of Session With Activity End of Session   SPO2 (%) 100  99   Heart Rate (BPM) 87  101   Blood Pressure 133/56

## 2025-04-30 NOTE — PROGRESS NOTES
OCCUPATIONAL THERAPY TREATMENT  Patient: Van Cassidy Jr. (85 y.o. male)  Date: 4/30/2025  Primary Diagnosis: Syncope and collapse [R55]  GI bleed [K92.2]  Procedure(s) (LRB):  ESOPHAGOGASTRODUODENOSCOPY (N/A)  COLONOSCOPY DIAGNOSTIC (N/A)     Precautions: General Precautions, Fall Risk, NPO (Clear liquid diet)                Recommendations for nursing mobility: Use of bed/chair alarm for safety, Use of BSC for toileting , and Assist x1    In place during session: Nasal Cannula 2L, External Catheter, and EKG/telemetry   Chart, occupational therapy assessment, plan of care, and goals were reviewed.  ASSESSMENT  Mr Bolaños continues with skilled OT services and is progressing towards goals. He was semi-supine upon OT arrival, agreeable to session. He was A&O x 4. . (See below for objective details and assist levels).     Overall he tolerated session today with additional time/rest breaks, adhered to verbal instructions consistently, and his spouse was supportive and engaged from start to completion of today's session. He was able to demonstrate good potential to use the commode in the bathroom utilizing intermittent rest breaks along with using a RW. He traveled 3-6 feet with assistance, no report of dizziness, but did display orthostatic BP (details below). . Current OT recommendations for discharge Continue to assess pending progress. Will continue to benefit from skilled OT services, and will continue to progress as tolerated.      Semi-Supine in Bed Sitting on EOB unsupported Standing with Support After Walking 5-6 feet from EOB to a chair - 1st trip After Walking 3-4 feet from one chair to another chair After Walking 3-4 feet from chair to EOB, then lie down semi-supine in bed   /53 105/77 108/61 103/49 104/51 116/53   Report of Dizziness None None None None None None     GOALS:    Occupational Therapy Goals:  Initiated 4/30/2025  Patient/Family stated goal: Go home  1.  Patient will perform lower body

## 2025-05-01 ENCOUNTER — ANESTHESIA EVENT (OUTPATIENT)
Facility: HOSPITAL | Age: 85
DRG: 377 | End: 2025-05-01
Payer: MEDICARE

## 2025-05-01 ENCOUNTER — APPOINTMENT (OUTPATIENT)
Facility: HOSPITAL | Age: 85
DRG: 377 | End: 2025-05-01
Payer: MEDICARE

## 2025-05-01 ENCOUNTER — ANESTHESIA (OUTPATIENT)
Facility: HOSPITAL | Age: 85
DRG: 377 | End: 2025-05-01
Payer: MEDICARE

## 2025-05-01 LAB
ALBUMIN SERPL-MCNC: 3.3 G/DL (ref 3.5–5)
ALBUMIN/GLOB SERPL: 1.1 (ref 1.1–2.2)
ALP SERPL-CCNC: 99 U/L (ref 45–117)
ALT SERPL-CCNC: 18 U/L (ref 12–78)
ANION GAP SERPL CALC-SCNC: 7 MMOL/L (ref 2–12)
AST SERPL W P-5'-P-CCNC: 17 U/L (ref 15–37)
BASOPHILS # BLD: 0.03 K/UL (ref 0–0.1)
BASOPHILS NFR BLD: 0.4 % (ref 0–1)
BILIRUB SERPL-MCNC: 0.7 MG/DL (ref 0.2–1)
BUN SERPL-MCNC: 14 MG/DL (ref 6–20)
BUN/CREAT SERPL: 16 (ref 12–20)
CA-I BLD-MCNC: 8.5 MG/DL (ref 8.5–10.1)
CHLORIDE SERPL-SCNC: 95 MMOL/L (ref 97–108)
CHLORIDE UR-SCNC: 21 MMOL/L
CO2 SERPL-SCNC: 26 MMOL/L (ref 21–32)
CREAT SERPL-MCNC: 0.88 MG/DL (ref 0.7–1.3)
DIFFERENTIAL METHOD BLD: ABNORMAL
ECHO BSA: 2.18 M2
EOSINOPHIL # BLD: 0.01 K/UL (ref 0–0.4)
EOSINOPHIL NFR BLD: 0.1 % (ref 0–7)
ERYTHROCYTE [DISTWIDTH] IN BLOOD BY AUTOMATED COUNT: 20.9 % (ref 11.5–14.5)
GLOBULIN SER CALC-MCNC: 3 G/DL (ref 2–4)
GLUCOSE SERPL-MCNC: 88 MG/DL (ref 65–100)
HCT VFR BLD AUTO: 24.9 % (ref 36.6–50.3)
HCT VFR BLD AUTO: 25.3 % (ref 36.6–50.3)
HCT VFR BLD AUTO: 28 % (ref 36.6–50.3)
HGB BLD-MCNC: 7.4 G/DL (ref 12.1–17)
HGB BLD-MCNC: 7.5 G/DL (ref 12.1–17)
HGB BLD-MCNC: 8.3 G/DL (ref 12.1–17)
IMM GRANULOCYTES # BLD AUTO: 0.04 K/UL (ref 0–0.04)
IMM GRANULOCYTES NFR BLD AUTO: 0.5 % (ref 0–0.5)
LYMPHOCYTES # BLD: 0.68 K/UL (ref 0.8–3.5)
LYMPHOCYTES NFR BLD: 8.2 % (ref 12–49)
MCH RBC QN AUTO: 23.4 PG (ref 26–34)
MCHC RBC AUTO-ENTMCNC: 29.6 G/DL (ref 30–36.5)
MCV RBC AUTO: 78.9 FL (ref 80–99)
MONOCYTES # BLD: 1.02 K/UL (ref 0–1)
MONOCYTES NFR BLD: 12.2 % (ref 5–13)
NEUTS SEG # BLD: 6.56 K/UL (ref 1.8–8)
NEUTS SEG NFR BLD: 78.6 % (ref 32–75)
NRBC # BLD: 0 K/UL (ref 0–0.01)
NRBC BLD-RTO: 0 PER 100 WBC
OSMOLALITY SERPL: 270 MOSM/KG H2O
PLATELET # BLD AUTO: 303 K/UL (ref 150–400)
PMV BLD AUTO: 10.5 FL (ref 8.9–12.9)
POTASSIUM SERPL-SCNC: 3.7 MMOL/L (ref 3.5–5.1)
POTASSIUM UR-SCNC: 64 MMOL/L
PROT SERPL-MCNC: 6.3 G/DL (ref 6.4–8.2)
RBC # BLD AUTO: 3.55 M/UL (ref 4.1–5.7)
SODIUM SERPL-SCNC: 128 MMOL/L (ref 136–145)
SODIUM UR-SCNC: 9 MMOL/L
URATE SERPL-MCNC: 3.7 MG/DL (ref 3.5–7.2)
VAS LEFT CCA DIST EDV: 16.3 CM/S
VAS LEFT CCA DIST PSV: 51.4 CM/S
VAS LEFT CCA PROX EDV: 21.6 CM/S
VAS LEFT CCA PROX PSV: 86.4 CM/S
VAS LEFT ECA EDV: 0 CM/S
VAS LEFT ECA PSV: 32.7 CM/S
VAS LEFT ICA DIST EDV: 24.4 CM/S
VAS LEFT ICA DIST PSV: 62.4 CM/S
VAS LEFT ICA PROX EDV: 16 CM/S
VAS LEFT ICA PROX PSV: 44.1 CM/S
VAS LEFT ICA/CCA PSV: 0.86
VAS LEFT SUBCLAVIAN PROX EDV: 3 CM/S
VAS LEFT SUBCLAVIAN PROX PSV: 203 CM/S
VAS LEFT VERTEBRAL EDV: 13.7 CM/S
VAS LEFT VERTEBRAL PSV: 105 CM/S
VAS RIGHT CCA DIST EDV: 0 CM/S
VAS RIGHT CCA DIST PSV: 71.6 CM/S
VAS RIGHT CCA PROX EDV: 9.6 CM/S
VAS RIGHT CCA PROX PSV: 154 CM/S
VAS RIGHT ECA EDV: 51.2 CM/S
VAS RIGHT ECA PSV: 82.9 CM/S
VAS RIGHT ICA DIST EDV: 19.9 CM/S
VAS RIGHT ICA DIST PSV: 106 CM/S
VAS RIGHT ICA PROX EDV: 21.1 CM/S
VAS RIGHT ICA PROX PSV: 120 CM/S
VAS RIGHT ICA/CCA PSV: 1.68
VAS RIGHT SUBCLAVIAN PROX EDV: 1.9 CM/S
VAS RIGHT SUBCLAVIAN PROX PSV: 34.5 CM/S
VAS RIGHT VERTEBRAL EDV: 24.2 CM/S
VAS RIGHT VERTEBRAL PSV: 152 CM/S
WBC # BLD AUTO: 8.3 K/UL (ref 4.1–11.1)

## 2025-05-01 PROCEDURE — 85025 COMPLETE CBC W/AUTO DIFF WBC: CPT

## 2025-05-01 PROCEDURE — 3700000001 HC ADD 15 MINUTES (ANESTHESIA): Performed by: INTERNAL MEDICINE

## 2025-05-01 PROCEDURE — 2700000000 HC OXYGEN THERAPY PER DAY

## 2025-05-01 PROCEDURE — 86850 RBC ANTIBODY SCREEN: CPT

## 2025-05-01 PROCEDURE — 6360000002 HC RX W HCPCS

## 2025-05-01 PROCEDURE — 83935 ASSAY OF URINE OSMOLALITY: CPT

## 2025-05-01 PROCEDURE — 7100000010 HC PHASE II RECOVERY - FIRST 15 MIN: Performed by: INTERNAL MEDICINE

## 2025-05-01 PROCEDURE — 3600007502: Performed by: INTERNAL MEDICINE

## 2025-05-01 PROCEDURE — 85018 HEMOGLOBIN: CPT

## 2025-05-01 PROCEDURE — 7100000011 HC PHASE II RECOVERY - ADDTL 15 MIN: Performed by: INTERNAL MEDICINE

## 2025-05-01 PROCEDURE — 3600007512: Performed by: INTERNAL MEDICINE

## 2025-05-01 PROCEDURE — 36415 COLL VENOUS BLD VENIPUNCTURE: CPT

## 2025-05-01 PROCEDURE — 93880 EXTRACRANIAL BILAT STUDY: CPT | Performed by: SURGERY

## 2025-05-01 PROCEDURE — 85014 HEMATOCRIT: CPT

## 2025-05-01 PROCEDURE — 6360000002 HC RX W HCPCS: Performed by: INTERNAL MEDICINE

## 2025-05-01 PROCEDURE — 84300 ASSAY OF URINE SODIUM: CPT

## 2025-05-01 PROCEDURE — 6370000000 HC RX 637 (ALT 250 FOR IP): Performed by: INTERNAL MEDICINE

## 2025-05-01 PROCEDURE — 83930 ASSAY OF BLOOD OSMOLALITY: CPT

## 2025-05-01 PROCEDURE — 6370000000 HC RX 637 (ALT 250 FOR IP)

## 2025-05-01 PROCEDURE — 36430 TRANSFUSION BLD/BLD COMPNT: CPT

## 2025-05-01 PROCEDURE — 6370000000 HC RX 637 (ALT 250 FOR IP): Performed by: PHYSICIAN ASSISTANT

## 2025-05-01 PROCEDURE — 94761 N-INVAS EAR/PLS OXIMETRY MLT: CPT

## 2025-05-01 PROCEDURE — 0DJ08ZZ INSPECTION OF UPPER INTESTINAL TRACT, VIA NATURAL OR ARTIFICIAL OPENING ENDOSCOPIC: ICD-10-PCS | Performed by: INTERNAL MEDICINE

## 2025-05-01 PROCEDURE — 84133 ASSAY OF URINE POTASSIUM: CPT

## 2025-05-01 PROCEDURE — 84550 ASSAY OF BLOOD/URIC ACID: CPT

## 2025-05-01 PROCEDURE — 86923 COMPATIBILITY TEST ELECTRIC: CPT

## 2025-05-01 PROCEDURE — 86900 BLOOD TYPING SEROLOGIC ABO: CPT

## 2025-05-01 PROCEDURE — 86901 BLOOD TYPING SEROLOGIC RH(D): CPT

## 2025-05-01 PROCEDURE — 82436 ASSAY OF URINE CHLORIDE: CPT

## 2025-05-01 PROCEDURE — 73630 X-RAY EXAM OF FOOT: CPT

## 2025-05-01 PROCEDURE — 2580000003 HC RX 258: Performed by: INTERNAL MEDICINE

## 2025-05-01 PROCEDURE — 2500000003 HC RX 250 WO HCPCS: Performed by: INTERNAL MEDICINE

## 2025-05-01 PROCEDURE — 80053 COMPREHEN METABOLIC PANEL: CPT

## 2025-05-01 PROCEDURE — 2709999900 HC NON-CHARGEABLE SUPPLY: Performed by: INTERNAL MEDICINE

## 2025-05-01 PROCEDURE — 1100000000 HC RM PRIVATE

## 2025-05-01 PROCEDURE — 94640 AIRWAY INHALATION TREATMENT: CPT

## 2025-05-01 PROCEDURE — P9016 RBC LEUKOCYTES REDUCED: HCPCS

## 2025-05-01 PROCEDURE — 3700000000 HC ANESTHESIA ATTENDED CARE: Performed by: INTERNAL MEDICINE

## 2025-05-01 PROCEDURE — 6360000002 HC RX W HCPCS: Performed by: NURSE ANESTHETIST, CERTIFIED REGISTERED

## 2025-05-01 RX ORDER — TRAMADOL HYDROCHLORIDE 50 MG/1
50 TABLET ORAL EVERY 6 HOURS PRN
Status: DISCONTINUED | OUTPATIENT
Start: 2025-05-01 | End: 2025-05-02 | Stop reason: HOSPADM

## 2025-05-01 RX ORDER — FUROSEMIDE 10 MG/ML
20 INJECTION INTRAMUSCULAR; INTRAVENOUS
Status: COMPLETED | OUTPATIENT
Start: 2025-05-01 | End: 2025-05-01

## 2025-05-01 RX ORDER — PHENYLEPHRINE HCL IN 0.9% NACL 1 MG/10 ML
SYRINGE (ML) INTRAVENOUS
Status: DISPENSED
Start: 2025-05-01 | End: 2025-05-01

## 2025-05-01 RX ORDER — SODIUM CHLORIDE 9 MG/ML
INJECTION, SOLUTION INTRAVENOUS PRN
Status: COMPLETED | OUTPATIENT
Start: 2025-05-01 | End: 2025-05-02

## 2025-05-01 RX ORDER — LIDOCAINE HYDROCHLORIDE 20 MG/ML
INJECTION, SOLUTION EPIDURAL; INFILTRATION; INTRACAUDAL; PERINEURAL
Status: COMPLETED
Start: 2025-05-01 | End: 2025-05-01

## 2025-05-01 RX ORDER — PREDNISONE 20 MG/1
20 TABLET ORAL DAILY
Status: DISCONTINUED | OUTPATIENT
Start: 2025-05-01 | End: 2025-05-02 | Stop reason: HOSPADM

## 2025-05-01 RX ORDER — LIDOCAINE HYDROCHLORIDE 20 MG/ML
INJECTION, SOLUTION EPIDURAL; INFILTRATION; INTRACAUDAL; PERINEURAL
Status: DISCONTINUED | OUTPATIENT
Start: 2025-05-01 | End: 2025-05-01 | Stop reason: SDUPTHER

## 2025-05-01 RX ORDER — PHENYLEPHRINE HCL IN 0.9% NACL 0.4MG/10ML
SYRINGE (ML) INTRAVENOUS
Status: DISCONTINUED | OUTPATIENT
Start: 2025-05-01 | End: 2025-05-01 | Stop reason: SDUPTHER

## 2025-05-01 RX ORDER — PROPOFOL 10 MG/ML
INJECTION, EMULSION INTRAVENOUS
Status: COMPLETED
Start: 2025-05-01 | End: 2025-05-01

## 2025-05-01 RX ADMIN — PREGABALIN 25 MG: 25 CAPSULE ORAL at 22:01

## 2025-05-01 RX ADMIN — Medication 100 MCG: at 09:45

## 2025-05-01 RX ADMIN — PROPOFOL 30 MG: 10 INJECTION, EMULSION INTRAVENOUS at 09:44

## 2025-05-01 RX ADMIN — TAMSULOSIN HYDROCHLORIDE 0.4 MG: 0.4 CAPSULE ORAL at 11:11

## 2025-05-01 RX ADMIN — CARVEDILOL 6.25 MG: 3.12 TABLET, FILM COATED ORAL at 17:04

## 2025-05-01 RX ADMIN — PANTOPRAZOLE SODIUM 40 MG: 40 INJECTION, POWDER, FOR SOLUTION INTRAVENOUS at 22:01

## 2025-05-01 RX ADMIN — Medication 100 MCG: at 09:50

## 2025-05-01 RX ADMIN — PREGABALIN 25 MG: 25 CAPSULE ORAL at 11:11

## 2025-05-01 RX ADMIN — SODIUM CHLORIDE, PRESERVATIVE FREE 10 ML: 5 INJECTION INTRAVENOUS at 21:42

## 2025-05-01 RX ADMIN — LIDOCAINE HYDROCHLORIDE 100 MG: 20 SOLUTION INTRAVENOUS at 09:41

## 2025-05-01 RX ADMIN — SODIUM CHLORIDE: 9 INJECTION, SOLUTION INTRAVENOUS at 09:38

## 2025-05-01 RX ADMIN — Medication 2 PUFF: at 07:43

## 2025-05-01 RX ADMIN — Medication 150 MCG: at 09:47

## 2025-05-01 RX ADMIN — AMIODARONE HYDROCHLORIDE 200 MG: 200 TABLET ORAL at 11:11

## 2025-05-01 RX ADMIN — FUROSEMIDE 20 MG: 40 TABLET ORAL at 11:11

## 2025-05-01 RX ADMIN — PROPOFOL 70 MG: 10 INJECTION, EMULSION INTRAVENOUS at 09:41

## 2025-05-01 RX ADMIN — ATORVASTATIN CALCIUM 20 MG: 20 TABLET, FILM COATED ORAL at 11:11

## 2025-05-01 RX ADMIN — CARVEDILOL 6.25 MG: 3.12 TABLET, FILM COATED ORAL at 11:11

## 2025-05-01 RX ADMIN — PANTOPRAZOLE SODIUM 40 MG: 40 INJECTION, POWDER, FOR SOLUTION INTRAVENOUS at 11:10

## 2025-05-01 RX ADMIN — TRAMADOL HYDROCHLORIDE 50 MG: 50 TABLET, COATED ORAL at 14:03

## 2025-05-01 RX ADMIN — POLYETHYLENE GLYCOL-3350 AND ELECTROLYTES 2000 ML: 236; 6.74; 5.86; 2.97; 22.74 POWDER, FOR SOLUTION ORAL at 18:00

## 2025-05-01 RX ADMIN — SODIUM CHLORIDE, PRESERVATIVE FREE 10 ML: 5 INJECTION INTRAVENOUS at 11:12

## 2025-05-01 RX ADMIN — TRAMADOL HYDROCHLORIDE 50 MG: 50 TABLET, COATED ORAL at 03:02

## 2025-05-01 RX ADMIN — FERROUS SULFATE TAB 325 MG (65 MG ELEMENTAL FE) 325 MG: 325 (65 FE) TAB at 11:11

## 2025-05-01 RX ADMIN — FUROSEMIDE 20 MG: 10 INJECTION, SOLUTION INTRAMUSCULAR; INTRAVENOUS at 22:13

## 2025-05-01 RX ADMIN — PREDNISONE 20 MG: 20 TABLET ORAL at 17:04

## 2025-05-01 RX ADMIN — Medication 2 PUFF: at 19:27

## 2025-05-01 ASSESSMENT — PAIN SCALES - GENERAL
PAINLEVEL_OUTOF10: 9
PAINLEVEL_OUTOF10: 0
PAINLEVEL_OUTOF10: 7
PAINLEVEL_OUTOF10: 7

## 2025-05-01 ASSESSMENT — PAIN DESCRIPTION - LOCATION
LOCATION: FOOT
LOCATION: FOOT

## 2025-05-01 ASSESSMENT — PAIN DESCRIPTION - DESCRIPTORS
DESCRIPTORS: ACHING;THROBBING;SORE
DESCRIPTORS: ACHING

## 2025-05-01 ASSESSMENT — PAIN DESCRIPTION - ORIENTATION
ORIENTATION: RIGHT
ORIENTATION: RIGHT

## 2025-05-01 ASSESSMENT — PAIN DESCRIPTION - PAIN TYPE
TYPE: ACUTE PAIN
TYPE: ACUTE PAIN

## 2025-05-01 ASSESSMENT — PAIN DESCRIPTION - FREQUENCY: FREQUENCY: CONTINUOUS

## 2025-05-01 ASSESSMENT — PAIN DESCRIPTION - ONSET: ONSET: ON-GOING

## 2025-05-01 ASSESSMENT — COPD QUESTIONNAIRES
CAT_SEVERITY: MODERATE
CAT_SEVERITY: MODERATE

## 2025-05-01 NOTE — PROGRESS NOTES
Progress Note      5/1/2025 12:20 PM  NAME: Van Cassidy Jr.   MRN:  [id number]   Admit Diagnosis: Syncope and collapse [R55]  GI bleed [K92.2]      Problem List:   History of syncope/fall  GI bleed  Chronic anemia  COPD  Nonsustained VT on Amio  History of AAA     Assessment/Plan:   GI workup underway, planned EGD/colonoscopy  No additional cardiac testing at this time  No arrhythmias documented on telemetry         []       High complexity decision making was performed in this patient at high risk for decompensation with multiple organ involvement.    Subjective:     Van Cassidy Jr. denies chest pain, dyspnea.  Discussed with RN events overnight.     Review of Systems:   Negative except for as noted above.    Objective:      Physical Exam:    Last 24hrs VS reviewed since prior progress note. Most recent are:    BP (!) 127/56   Pulse 83   Temp 97.9 °F (36.6 °C) (Oral)   Resp 21   Ht 1.778 m (5' 10\")   Wt 89.6 kg (197 lb 8.5 oz)   SpO2 100%   BMI 28.34 kg/m²     Intake/Output Summary (Last 24 hours) at 5/1/2025 1220  Last data filed at 5/1/2025 0944  Gross per 24 hour   Intake 600 ml   Output 700 ml   Net -100 ml        General Appearance: Alert; no acute distress.  Ears/Nose/Mouth/Throat: moist mucous membranes  Neck: Supple.  Chest: Lungs clear to auscultation bilaterally.  Cardiovascular: Regular rate and rhythm, S1S2 normal  Abdomen: Soft, non-tender, bowel sounds are active.  Extremities: No edema bilaterally.  Skin: Warm and dry.      PMH/SH reviewed - no change compared to H&P        []  No new EKG for review    Lab Data Personally Reviewed:    Recent Labs     04/30/25  0309 04/30/25  1651 05/01/25  0255 05/01/25  1025   WBC 4.9  --  8.3  --    HGB 8.2*   < > 8.3* 7.4*   HCT 27.6*   < > 28.0* 24.9*     --  303  --     < > = values in this interval not displayed.     Recent Labs     04/29/25  0821   INR 1.0      Recent Labs     04/29/25  1742 04/30/25  0309 05/01/25  0255   *

## 2025-05-01 NOTE — ANESTHESIA PRE PROCEDURE
IVPB (Peripheral Line)  10 mEq IntraVENous PRN Priscilla Munoz MD       • magnesium sulfate 2000 mg in 50 mL IVPB premix  2,000 mg IntraVENous PRN Priscilla Munoz MD   Stopped at 04/29/25 1917   • [Held by provider] enoxaparin (LOVENOX) injection 40 mg  40 mg SubCUTAneous Daily Priscilla Munoz MD   40 mg at 04/30/25 0838   • polyethylene glycol (GLYCOLAX) packet 17 g  17 g Oral Daily PRN Priscilla Munoz MD       • acetaminophen (TYLENOL) tablet 650 mg  650 mg Oral Q6H PRN Priscilla Munoz MD        Or   • acetaminophen (TYLENOL) suppository 650 mg  650 mg Rectal Q6H PRN Priscilla Munoz MD       • albuterol sulfate HFA (PROVENTIL;VENTOLIN;PROAIR) 108 (90 Base) MCG/ACT inhaler 2 puff  2 puff Inhalation Q4H PRN Priscilla Munoz MD       • ALPRAZolam (XANAX) tablet 0.25 mg  0.25 mg Oral Nightly PRN Priscilla Munoz MD   0.25 mg at 04/30/25 2047   • amiodarone (CORDARONE) tablet 200 mg  200 mg Oral Daily Priscilla Munoz MD   200 mg at 04/30/25 0837   • [Held by provider] aspirin chewable tablet 81 mg  81 mg Oral Daily Priscilla Munoz MD   81 mg at 04/30/25 0837   • atorvastatin (LIPITOR) tablet 20 mg  20 mg Oral Daily Priscilla Munoz MD   20 mg at 04/30/25 0838   • carvedilol (COREG) tablet 6.25 mg  6.25 mg Oral BID WC Priscilla Munoz MD   6.25 mg at 04/30/25 1714   • ferrous sulfate (IRON 325) tablet 325 mg  325 mg Oral Daily with breakfast Priscilla Munoz MD   325 mg at 04/30/25 0838   • furosemide (LASIX) tablet 20 mg  20 mg Oral Daily Priscilla Munoz MD   20 mg at 04/30/25 0846   • lidocaine 4 % external patch 1 patch  1 patch TransDERmal Daily Priscilla Munoz MD   1 patch at 04/30/25 0839   • pregabalin (LYRICA) capsule 25 mg  25 mg Oral BID Priscilla Munoz MD   25 mg at 04/30/25 2047   • tamsulosin (FLOMAX) capsule 0.4 mg  0.4 mg Oral Daily Priscilla Munoz MD   0.4 mg at 04/30/25 0838   • budesonide-formoterol (SYMBICORT) 160-4.5 MCG/ACT inhaler 2 puff  2 puff Inhalation BID Priscilla Munoz MD   2 puff at 05/01/25 0743       Allergies:

## 2025-05-01 NOTE — CONSENT
Informed Consent for Blood Component Transfusion Note    I have discussed with the patient the rationale for blood component transfusion; its benefits in treating or preventing fatigue, organ damage, or death; and its risk which includes mild transfusion reactions, rare risk of blood borne infection, or more serious but rare reactions. I have discussed the alternatives to transfusion, including the risk and consequences of not receiving transfusion. The patient had an opportunity to ask questions and had agreed to proceed with transfusion of blood components.    Electronically signed by Chelle Diallo MD on 5/1/25 at 2:28 PM EDT

## 2025-05-01 NOTE — PROGRESS NOTES
Hospitalist Progress Note               Daily Progress Note: 5/1/2025      Hospital Day: 3     Chief complaint:   Chief Complaint   Patient presents with    Fall        Brief HPI/ Hospital course to date:  This is an 84 yo M with PMH of male with PMHx significant for Hypertension, CAD status post CABG, AAA status post stenting, COPD on 2L, GERD, hyperlipidemia, BPH, previous smoker, hard of hearing  who presents to the hospital status post syncope and fall.  The patient states that he woke up in the morning to use the bathroom, stood up took a few steps and fell.  He admits to loss of consciousness which was brief according to the wife.  He is unaware of whether he hit his head or not but believes he did so.  He did sustain trauma to his bilateral knees, right hip and chest wall.  He denied any presyncopal symptoms such as lightheaded, dizziness, palpitations, chest pain.  No focal motor weakness comparable to his baseline.  Workup in the ER included CT of the head which did not show any acute process and radiological survey of the chest, hip, knees which did not reveal any acute fracture.  EKG was reviewed and showed a prolonged QT.  His only current complaint to me is soreness on his bilateral knees, ribs and hip.     Of note, patient was recently admitted and noted to be anemic status post transfusion of several units of blood.  He had follow-up with his cardiologist as an outpatient, underwent stress testing and was cleared for colonoscopy which they are in the process of scheduling.  He is notably anemic on presentation today, however is not too far off his last hemoglobin.  That being noted he does admit to dark stools, however he is on iron supplements.  Denies any recent diarrhea.  Is in fact constipated.     Admitting last night he did take a Xanax which he had been taking for the last couple of nights and is unsure if that contributed to his symptoms.  That being noted he did wake up on another

## 2025-05-01 NOTE — PROGRESS NOTES
Patient completed EGD this am.     Patient DCP is to return home with his wife. Patient has nocturnal oxygen at home, unsure of DME company and name of HH company. CM asked patient and daughter and both unsure. Daughter will find out names and give to CM in am.

## 2025-05-01 NOTE — PROGRESS NOTES
Chart reviewed and history obtained. Received consent from RN to attempt to treat patient. Patient agreeable to PT/OT treatment however c/o R foot pain. Patient stated that he hurt it last night getting tangled in the linen in bed. Patient had an x-ray. Noted x-ray was negative for fracture. While looking at x-ray report Dr. Diallo entered patient's room and informed patient that he needed a blood transfusion. Spoke with Dr. Diallo who stated to hold PT until after transfusion. Will continue to follow this patient and attempt  treatments at a later time/date. Thank you

## 2025-05-01 NOTE — ANESTHESIA POSTPROCEDURE EVALUATION
Department of Anesthesiology  Postprocedure Note    Patient: Van Cassidy Jr.  MRN: [id number]  YOB: 1940  Date of evaluation: 5/1/2025    Procedure Summary       Date: 05/01/25 Room / Location: [facility] 04 / [facility] ENDOSCOPY    Anesthesia Start: 0938 Anesthesia Stop:     Procedure: ESOPHAGOGASTRODUODENOSCOPY (Upper GI Region) Diagnosis:       Gastrointestinal hemorrhage, unspecified gastrointestinal hemorrhage type      (Gastrointestinal hemorrhage, unspecified gastrointestinal hemorrhage type [K92.2])    Surgeons: Francisco Tracy MD Responsible Provider: Karissa Ricketts MD    Anesthesia Type: MAC, TIVA ASA Status: 3            Anesthesia Type: No value filed.    Tha Phase I:      Tha Phase II:      Anesthesia Post Evaluation    Patient location during evaluation: bedside  Patient participation: complete - patient participated  Level of consciousness: sleepy but conscious  Pain score: 0  Airway patency: patent  Nausea & Vomiting: no nausea and no vomiting  Cardiovascular status: hemodynamically stable  Respiratory status: spontaneous ventilation and acceptable  Hydration status: stable  Comments: Patient handed off to Endoscopy RN w/ adequate spontaneous respirations, moving all 4 extremities, and all questions answered about anesthesia care. VSS.  Pain management: adequate    No notable events documented.

## 2025-05-02 ENCOUNTER — ANESTHESIA (OUTPATIENT)
Facility: HOSPITAL | Age: 85
DRG: 377 | End: 2025-05-02
Payer: MEDICARE

## 2025-05-02 VITALS
WEIGHT: 205.47 LBS | OXYGEN SATURATION: 94 % | SYSTOLIC BLOOD PRESSURE: 140 MMHG | HEIGHT: 70 IN | TEMPERATURE: 98.1 F | DIASTOLIC BLOOD PRESSURE: 56 MMHG | HEART RATE: 87 BPM | RESPIRATION RATE: 18 BRPM | BODY MASS INDEX: 29.42 KG/M2

## 2025-05-02 LAB
ABO + RH BLD: NORMAL
ANION GAP SERPL CALC-SCNC: 7 MMOL/L (ref 2–12)
BASOPHILS # BLD: 0 K/UL (ref 0–0.1)
BASOPHILS NFR BLD: 0 % (ref 0–1)
BLD PROD TYP BPU: NORMAL
BLOOD BANK BLOOD PRODUCT EXPIRATION DATE: NORMAL
BLOOD BANK DISPENSE STATUS: NORMAL
BLOOD BANK ISBT PRODUCT BLOOD TYPE: 6200
BLOOD BANK PRODUCT CODE: NORMAL
BLOOD BANK UNIT TYPE AND RH: NORMAL
BLOOD GROUP ANTIBODIES SERPL: NEGATIVE
BPU ID: NORMAL
BUN SERPL-MCNC: 17 MG/DL (ref 6–20)
BUN/CREAT SERPL: 21 (ref 12–20)
CA-I BLD-MCNC: 8 MG/DL (ref 8.5–10.1)
CHLORIDE SERPL-SCNC: 96 MMOL/L (ref 97–108)
CO2 SERPL-SCNC: 27 MMOL/L (ref 21–32)
CREAT SERPL-MCNC: 0.82 MG/DL (ref 0.7–1.3)
CROSSMATCH RESULT: NORMAL
DIFFERENTIAL METHOD BLD: ABNORMAL
EOSINOPHIL # BLD: 0 K/UL (ref 0–0.4)
EOSINOPHIL NFR BLD: 0 % (ref 0–7)
ERYTHROCYTE [DISTWIDTH] IN BLOOD BY AUTOMATED COUNT: 20.2 % (ref 11.5–14.5)
GLUCOSE SERPL-MCNC: 128 MG/DL (ref 65–100)
HCT VFR BLD AUTO: 28.2 % (ref 36.6–50.3)
HCT VFR BLD AUTO: 28.4 % (ref 36.6–50.3)
HGB BLD-MCNC: 8.7 G/DL (ref 12.1–17)
HGB BLD-MCNC: 8.8 G/DL (ref 12.1–17)
IMM GRANULOCYTES # BLD AUTO: 0.03 K/UL (ref 0–0.04)
IMM GRANULOCYTES NFR BLD AUTO: 0.4 % (ref 0–0.5)
LYMPHOCYTES # BLD: 0.43 K/UL (ref 0.8–3.5)
LYMPHOCYTES NFR BLD: 5.9 % (ref 12–49)
MCH RBC QN AUTO: 24.6 PG (ref 26–34)
MCHC RBC AUTO-ENTMCNC: 31.2 G/DL (ref 30–36.5)
MCV RBC AUTO: 78.8 FL (ref 80–99)
MONOCYTES # BLD: 0.54 K/UL (ref 0–1)
MONOCYTES NFR BLD: 7.4 % (ref 5–13)
NEUTS SEG # BLD: 6.3 K/UL (ref 1.8–8)
NEUTS SEG NFR BLD: 86.3 % (ref 32–75)
NRBC # BLD: 0 K/UL (ref 0–0.01)
NRBC BLD-RTO: 0 PER 100 WBC
OSMOLALITY UR: 493 MOSM/KG H2O
PLATELET # BLD AUTO: 256 K/UL (ref 150–400)
PMV BLD AUTO: 10.1 FL (ref 8.9–12.9)
POTASSIUM SERPL-SCNC: 4 MMOL/L (ref 3.5–5.1)
RBC # BLD AUTO: 3.58 M/UL (ref 4.1–5.7)
SODIUM SERPL-SCNC: 130 MMOL/L (ref 136–145)
SPECIMEN EXP DATE BLD: NORMAL
TRANSFUSION STATUS PATIENT QL: NORMAL
UNIT DIVISION: 0
UNIT ISSUE DATE/TIME: NORMAL
WBC # BLD AUTO: 7.3 K/UL (ref 4.1–11.1)

## 2025-05-02 PROCEDURE — 6370000000 HC RX 637 (ALT 250 FOR IP)

## 2025-05-02 PROCEDURE — 6370000000 HC RX 637 (ALT 250 FOR IP): Performed by: INTERNAL MEDICINE

## 2025-05-02 PROCEDURE — 2580000003 HC RX 258

## 2025-05-02 PROCEDURE — 97535 SELF CARE MNGMENT TRAINING: CPT

## 2025-05-02 PROCEDURE — 94618 PULMONARY STRESS TESTING: CPT

## 2025-05-02 PROCEDURE — 88305 TISSUE EXAM BY PATHOLOGIST: CPT

## 2025-05-02 PROCEDURE — 3600007502: Performed by: INTERNAL MEDICINE

## 2025-05-02 PROCEDURE — 3600007512: Performed by: INTERNAL MEDICINE

## 2025-05-02 PROCEDURE — 3700000001 HC ADD 15 MINUTES (ANESTHESIA): Performed by: INTERNAL MEDICINE

## 2025-05-02 PROCEDURE — 85025 COMPLETE CBC W/AUTO DIFF WBC: CPT

## 2025-05-02 PROCEDURE — 30233N1 TRANSFUSION OF NONAUTOLOGOUS RED BLOOD CELLS INTO PERIPHERAL VEIN, PERCUTANEOUS APPROACH: ICD-10-PCS | Performed by: INTERNAL MEDICINE

## 2025-05-02 PROCEDURE — 6360000002 HC RX W HCPCS: Performed by: NURSE ANESTHETIST, CERTIFIED REGISTERED

## 2025-05-02 PROCEDURE — 85018 HEMOGLOBIN: CPT

## 2025-05-02 PROCEDURE — 94664 DEMO&/EVAL PT USE INHALER: CPT

## 2025-05-02 PROCEDURE — 7100000011 HC PHASE II RECOVERY - ADDTL 15 MIN: Performed by: INTERNAL MEDICINE

## 2025-05-02 PROCEDURE — 2500000003 HC RX 250 WO HCPCS: Performed by: INTERNAL MEDICINE

## 2025-05-02 PROCEDURE — 6360000002 HC RX W HCPCS: Performed by: INTERNAL MEDICINE

## 2025-05-02 PROCEDURE — 3700000000 HC ANESTHESIA ATTENDED CARE: Performed by: INTERNAL MEDICINE

## 2025-05-02 PROCEDURE — 7100000010 HC PHASE II RECOVERY - FIRST 15 MIN: Performed by: INTERNAL MEDICINE

## 2025-05-02 PROCEDURE — 36415 COLL VENOUS BLD VENIPUNCTURE: CPT

## 2025-05-02 PROCEDURE — 99222 1ST HOSP IP/OBS MODERATE 55: CPT | Performed by: SURGERY

## 2025-05-02 PROCEDURE — 94761 N-INVAS EAR/PLS OXIMETRY MLT: CPT

## 2025-05-02 PROCEDURE — 0DBH8ZX EXCISION OF CECUM, VIA NATURAL OR ARTIFICIAL OPENING ENDOSCOPIC, DIAGNOSTIC: ICD-10-PCS | Performed by: INTERNAL MEDICINE

## 2025-05-02 PROCEDURE — 97530 THERAPEUTIC ACTIVITIES: CPT

## 2025-05-02 PROCEDURE — 80048 BASIC METABOLIC PNL TOTAL CA: CPT

## 2025-05-02 PROCEDURE — 94640 AIRWAY INHALATION TREATMENT: CPT

## 2025-05-02 PROCEDURE — 2700000000 HC OXYGEN THERAPY PER DAY

## 2025-05-02 PROCEDURE — 85014 HEMATOCRIT: CPT

## 2025-05-02 PROCEDURE — 2709999900 HC NON-CHARGEABLE SUPPLY: Performed by: INTERNAL MEDICINE

## 2025-05-02 RX ORDER — PHENYLEPHRINE HCL IN 0.9% NACL 1 MG/10 ML
SYRINGE (ML) INTRAVENOUS
Status: DISCONTINUED | OUTPATIENT
Start: 2025-05-02 | End: 2025-05-02 | Stop reason: SDUPTHER

## 2025-05-02 RX ORDER — PROPOFOL 10 MG/ML
INJECTION, EMULSION INTRAVENOUS
Status: DISCONTINUED | OUTPATIENT
Start: 2025-05-02 | End: 2025-05-02 | Stop reason: SDUPTHER

## 2025-05-02 RX ORDER — CARVEDILOL 6.25 MG/1
6.25 TABLET ORAL 2 TIMES DAILY WITH MEALS
Qty: 60 TABLET | Refills: 0 | Status: SHIPPED | OUTPATIENT
Start: 2025-05-02

## 2025-05-02 RX ORDER — ASPIRIN 81 MG/1
81 TABLET, CHEWABLE ORAL DAILY
Qty: 30 TABLET | Refills: 0 | Status: SHIPPED | OUTPATIENT
Start: 2025-05-06

## 2025-05-02 RX ORDER — LIDOCAINE HYDROCHLORIDE 20 MG/ML
INJECTION, SOLUTION EPIDURAL; INFILTRATION; INTRACAUDAL; PERINEURAL
Status: DISCONTINUED | OUTPATIENT
Start: 2025-05-02 | End: 2025-05-02 | Stop reason: SDUPTHER

## 2025-05-02 RX ORDER — 0.9 % SODIUM CHLORIDE 0.9 %
250 INTRAVENOUS SOLUTION INTRAVENOUS ONCE
Status: COMPLETED | OUTPATIENT
Start: 2025-05-02 | End: 2025-05-02

## 2025-05-02 RX ADMIN — PROPOFOL 20 MG: 10 INJECTION, EMULSION INTRAVENOUS at 11:56

## 2025-05-02 RX ADMIN — CARVEDILOL 6.25 MG: 3.12 TABLET, FILM COATED ORAL at 13:17

## 2025-05-02 RX ADMIN — CARVEDILOL 6.25 MG: 3.12 TABLET, FILM COATED ORAL at 18:43

## 2025-05-02 RX ADMIN — ATORVASTATIN CALCIUM 20 MG: 20 TABLET, FILM COATED ORAL at 13:16

## 2025-05-02 RX ADMIN — SODIUM CHLORIDE 250 ML: 0.9 INJECTION, SOLUTION INTRAVENOUS at 10:51

## 2025-05-02 RX ADMIN — PANTOPRAZOLE SODIUM 40 MG: 40 INJECTION, POWDER, FOR SOLUTION INTRAVENOUS at 09:15

## 2025-05-02 RX ADMIN — Medication 200 MCG: at 11:58

## 2025-05-02 RX ADMIN — PREDNISONE 20 MG: 20 TABLET ORAL at 13:16

## 2025-05-02 RX ADMIN — PROPOFOL 20 MG: 10 INJECTION, EMULSION INTRAVENOUS at 11:53

## 2025-05-02 RX ADMIN — TAMSULOSIN HYDROCHLORIDE 0.4 MG: 0.4 CAPSULE ORAL at 13:16

## 2025-05-02 RX ADMIN — AMIODARONE HYDROCHLORIDE 200 MG: 200 TABLET ORAL at 13:16

## 2025-05-02 RX ADMIN — SODIUM CHLORIDE: 9 INJECTION, SOLUTION INTRAVENOUS at 11:46

## 2025-05-02 RX ADMIN — SODIUM CHLORIDE, PRESERVATIVE FREE 10 ML: 5 INJECTION INTRAVENOUS at 10:54

## 2025-05-02 RX ADMIN — PROPOFOL 60 MG: 10 INJECTION, EMULSION INTRAVENOUS at 11:50

## 2025-05-02 RX ADMIN — Medication 2 PUFF: at 10:20

## 2025-05-02 RX ADMIN — LIDOCAINE HYDROCHLORIDE 80 MG: 20 INJECTION, SOLUTION EPIDURAL; INFILTRATION; INTRACAUDAL; PERINEURAL at 11:50

## 2025-05-02 RX ADMIN — PREGABALIN 25 MG: 25 CAPSULE ORAL at 13:17

## 2025-05-02 RX ADMIN — FERROUS SULFATE TAB 325 MG (65 MG ELEMENTAL FE) 325 MG: 325 (65 FE) TAB at 13:17

## 2025-05-02 ASSESSMENT — 6 MINUTE WALK TEST (6MWT)
HEART RATE: 86
BORG FATIGUE SCALE SCORE: VERY, VERY SLIGHT (JUST NOTICEABLE)
OXYGEN DEVICE: ROOM AIR
DID PATIENT STOP OR PAUSE BEFORE 6 MINUTES?: NO
BORG FATIGUE SCALE SCORE: NOTHING AT ALL
HEART RATE: 98
BORG DYSPNEA SCALE SCORE: NOTHING AT ALL
O2 SATURATION: 94
O2 SATURATION: 94
COMMENTS: ON 1 L NC
O2 SATURATION 2: 92
O2 FLOW RATE 2 (L/MIN): 1
ADDTIONAL O2 FLOW RATE (L/MIN): YES
HEART RATE: 87
O2 FLOW RATE (L/MIN): 1
O2 SATURATION: 88
O2 SATURATION: 93
BORG DYSPNEA SCALE SCORE: NOTHING AT ALL
BORG FATIGUE SCALE SCORE: VERY, VERY SLIGHT (JUST NOTICEABLE)
HEART RATE: 95
BORG DYSPNEA SCALE SCORE: NOTHING AT ALL

## 2025-05-02 ASSESSMENT — PAIN SCALES - GENERAL
PAINLEVEL_OUTOF10: 0

## 2025-05-02 NOTE — PROGRESS NOTES
Progress Note      5/2/2025 10:50 AM  NAME: Van Cassidy Jr.   MRN:  [id number]   Admit Diagnosis: Syncope and collapse [R55]  GI bleed [K92.2]          Assessment/Plan:   Fall/syncope, has been out of bed, no significant arrhythmias seen.  Normal LV function 3/25 by echo    GI bleed/anemia, for colonoscopy today.  Had upper endoscopy showing gastritis without active bleeding.    Chronic anemia, stable hemoglobin    COPD, on home oxygen    Nonsustained VT: Continues amiodarone.  Chest x-ray without acute pathology.  Normal AST, ALT.  Check TSH.    History of AAA stenting            Subjective:     Van Cassidy Jr. denies chest pain, dyspnea.  Wants to go home if colonoscopy is negative.  Discussed with RN events overnight.     Review of Systems:    Symptom Y/N Comments  Symptom Y/N Comments   Fever/Chills N   Chest Pain N    Poor Appetite N   Edema N    Cough N   Abdominal Pain N    Sputum N   Joint Pain N    SOB/BROWN N   Pruritis/Rash N    Nausea/vomit N   Tolerating PT/OT Y    Diarrhea N   Tolerating Diet Y    Constipation N   Other       Could NOT obtain due to:      Objective:      Physical Exam:    Last 24hrs VS reviewed since prior progress note. Most recent are:    BP (!) 132/57   Pulse 75   Temp 98.1 °F (36.7 °C) (Oral)   Resp 18   Ht 1.778 m (5' 10\")   Wt 93.2 kg (205 lb 7.5 oz)   SpO2 97%   BMI 29.48 kg/m²     Intake/Output Summary (Last 24 hours) at 5/2/2025 1050  Last data filed at 5/1/2025 2207  Gross per 24 hour   Intake 1552.67 ml   Output 750 ml   Net 802.67 ml        General Appearance: Elderly male, overweight, alert; no acute distress.  Ears/Nose/Mouth/Throat: Hearing grossly normal; moist mucous membranes  Neck: Supple.  Chest: Lungs clear to auscultation bilaterally.  Cardiovascular: Regular rate and rhythm, S1S2 normal, no murmur.  Abdomen: Soft, non-tender, bowel sounds are active.  Extremities: No edema bilaterally.  Skin: Warm and dry.    []         Post-cath site without hematoma,

## 2025-05-02 NOTE — ANESTHESIA POSTPROCEDURE EVALUATION
Department of Anesthesiology  Postprocedure Note    Patient: Van Cassidy Jr.  MRN: [id number]  YOB: 1940  Date of evaluation: 5/2/2025    Procedure Summary       Date: 05/02/25 Room / Location: [facility] ENDO 03 / [facility] ENDOSCOPY    Anesthesia Start: 1146 Anesthesia Stop: 1202    Procedure: COLONOSCOPY DIAGNOSTIC (Lower GI Region) Diagnosis:       Gastrointestinal hemorrhage, unspecified gastrointestinal hemorrhage type      (Gastrointestinal hemorrhage, unspecified gastrointestinal hemorrhage type [K92.2])    Surgeons: Francisco Tracy MD Responsible Provider: Lupillo Rubio MD    Anesthesia Type: MAC ASA Status: 3            Anesthesia Type: MAC    Tha Phase I:      Tha Phase II: Tha Score: 8    Anesthesia Post Evaluation    Patient location during evaluation: bedside  Patient participation: complete - patient participated  Level of consciousness: lethargic  Pain score: 0  Airway patency: patent  Nausea & Vomiting: no nausea and no vomiting  Cardiovascular status: hemodynamically stable  Respiratory status: acceptable  Hydration status: stable  Comments: VSS. Report to RN. Remains on   Pain management: adequate        No notable events documented.

## 2025-05-02 NOTE — PROGRESS NOTES
Dr. Barnett, Cardiologist, cleared  the patient to go home when he came and assessed the patient at bedside.     1715H informed Dr. Diallo who is at bedside.

## 2025-05-02 NOTE — DISCHARGE SUMMARY
Physician Discharge Summary     Patient ID:    Van Cassidy Jr.  [id number]  85 y.o.  1940    Admit date: 4/29/2025    Discharge date : 5/2/2025      Final Diagnoses:   Syncope and collapse [R55]  GI bleed [K92.2]  Acute on chronic blood loss anemia  Hypotension  Hyponatremia      Reason for Hospitalization: Fall        Hospital Course:     his is an 84 yo M with PMH of male with PMHx significant for Hypertension, CAD status post CABG, AAA status post stenting, COPD on 2L, GERD, hyperlipidemia, BPH, previous smoker, hard of hearing  who presents to the hospital status post syncope and fall.  The patient states that he woke up in the morning to use the bathroom, stood up took a few steps and fell.  He admits to loss of consciousness which was brief according to the wife.  He is unaware of whether he hit his head or not but believes he did so.  He did sustain trauma to his bilateral knees, right hip and chest wall.  He denied any presyncopal symptoms such as lightheaded, dizziness, palpitations, chest pain.  No focal motor weakness comparable to his baseline.  Workup in the ER included CT of the head which did not show any acute process and radiological survey of the chest, hip, knees which did not reveal any acute fracture.  EKG was reviewed and showed a prolonged QT.  His only current complaint to me is soreness on his bilateral knees, ribs and hip.     Of note, patient was recently admitted and noted to be anemic status post transfusion of several units of blood.  He had follow-up with his cardiologist as an outpatient, underwent stress testing and was cleared for colonoscopy which they are in the process of scheduling.  He is notably anemic on presentation today, however is not too far off his last hemoglobin.  That being noted he does admit to dark stools, however he is on iron supplements.  Denies any recent diarrhea.  Is in fact constipated.    During the hospitalization, carotid duplex

## 2025-05-02 NOTE — PROGRESS NOTES
PT treatment session attempted at 1519, however pt sleeping and pt's wife requesting that PTA do not wake up patient. Pt left sleeping and will attempt at a later time. Thank you.

## 2025-05-02 NOTE — PROGRESS NOTES
CM noted discharge order. Patient asleep in hospital bed and wife and daughter at bedside. CM asked wife about name of HH company and oxygen company.     Wife reports [facility] services and [employer] supplies oxygen to patient. Wife signed choice letter for [facility] at discharge. Referral sent, [facility] accepted. SOC is 5/6/25.     Patient will discharge later this evening after GI and cardio clearance and PT, OT Evaluate.

## 2025-05-02 NOTE — PROGRESS NOTES
RT on duty was able to talk to Dr. Diallo. Hospitalist cleared the patient to go home via [employer]. Patient's IV  and telemetry box removed.     1900H printed discharge papers and discussed each with the patient's wife. Assisted patient in changing to regular clothes, manwick removed and diaper attached. Patient on oxygen support wheeled down by patient [email or web address]. Patient went home with family, took his personal belongings and discharge packet.

## 2025-05-02 NOTE — CONSULTS
General surgery history and Physical    Patient: Van Cassidy Jr.  MRN: [id number]    YOB: 1940  Age: 85 y.o.  Sex: male     Chief Complaint:  Chief Complaint   Patient presents with    Fall       History of Present Illness: Van Cassiyd Jr. is a 85 y.o. very pleasant gentleman examined this morning.  I was consulted for suspicious for left subclavian artery stenosis.  Chart reviewed.  Patient appears to be comfortable without any distress.  Patient is coherent.  Patient denies any pain or numbness on the left hand.    Social History:  Social Connections: Not on file       Past Medical History:  Past Medical History:   Diagnosis Date    High cholesterol     HTN (hypertension)      Surgical History:  Past Surgical History:   Procedure Laterality Date    ABDOMINAL AORTIC ANEURYSM REPAIR      OTHER SURGICAL HISTORY      open heart    UPPER GASTROINTESTINAL ENDOSCOPY N/A 5/1/2025    ESOPHAGOGASTRODUODENOSCOPY performed by Francisco Tracy MD at [facility] ENDOSCOPY       Allergies:  Allergies   Allergen Reactions    Codeine Hallucinations       Current Meds:  Current Facility-Administered Medications   Medication Dose Route Frequency Provider Last Rate Last Admin    traMADol (ULTRAM) tablet 50 mg  50 mg Oral Q6H PRN Charli Arnold PA-C   50 mg at 05/01/25 1403    diclofenac sodium (VOLTAREN) 1 % gel 2 g  2 g Topical Daily PRN Chelle Diallo MD        0.9 % sodium chloride infusion   IntraVENous PRN Chelle Diallo MD        predniSONE (DELTASONE) tablet 20 mg  20 mg Oral Daily Chelle Diallo MD   20 mg at 05/01/25 1704    pantoprazole (PROTONIX) injection 40 mg  40 mg IntraVENous BID Agustín Stack MD   40 mg at 05/01/25 2201    sodium chloride flush 0.9 % injection 5-40 mL  5-40 mL IntraVENous 2 times per day Priscilla Munoz MD   10 mL at 05/01/25 2142    sodium chloride flush 0.9 % injection 5-40 mL  5-40 mL IntraVENous PRN Priscilla Munoz MD        0.9 % sodium chloride infusion   IntraVENous

## 2025-05-02 NOTE — PROGRESS NOTES
Dr. Diallo who is at bedside was informed by family and patient that he wants to go home and home health has been set-up. Pending PT/OT evaluation not needed. Dr. Diallo informed patient gets up with walker and 1 assist. Pending RT 6 minute walk test, patient now in room air.

## 2025-05-02 NOTE — PROGRESS NOTES
OCCUPATIONAL THERAPY TREATMENT  Patient: Van Cassidy Jr. (85 y.o. male)  Date: 5/2/2025  Primary Diagnosis: Syncope and collapse [R55]  GI bleed [K92.2]  Procedure(s) (LRB):  ESOPHAGOGASTRODUODENOSCOPY (N/A) 1 Day Post-Op   Precautions: General Precautions, Fall Risk, NPO (Clear liquid diet)                Recommendations for nursing mobility: Use of bed/chair alarm for safety, Use of BSC for toileting , and Assist x1    In place during session: Nasal Cannula 2L, External Catheter, and EKG/telemetry   Chart, occupational therapy assessment, plan of care, and goals were reviewed.  ASSESSMENT  Patient continues with skilled OT services and is progressing towards goals. Pt presented semi supine upon YEN arrival, agreeable to session. Pt A&O x 4. Pt required encouragement. Pt verbalized being upset regarding not able to eat and tests being delayed.  Therapist completed active listening and acknowledged pt's concerns.  Pt was then agreeable to participate.  Therapist completed orthostatic BP (see below).  Pt orthostatic so OOB activity deferred.  Pt completed LB dressing and then returned semi supine.  Pt left semi supine with all needs met.  (See below for objective details and assist levels).     Overall pt tolerated session fair today. No OOB activity secondary orthostatic hypotension.  Current OT recommendations for discharge Continue to assess pending progress. Will continue to benefit from skilled OT services, and will continue to progress as tolerated.      Start of Session Sitting EOB Standing End of Session   SPO2 (%) 99   100   Heart Rate (BPM) 86   84   /63 (supine) 146/57 116/52      GOALS:    Problem: Occupational Therapy - Adult  Goal: By Discharge: Performs self-care activities at highest level of function for planned discharge setting.  See evaluation for individualized goals.  Description: FUNCTIONAL STATUS PRIOR TO ADMISSION:  Pt was modified independent using RW for functional mobility. Pt

## 2025-05-02 NOTE — PROGRESS NOTES
Ambulatory oximetry performed with patient, results below.    Patient required 1 L NC to maintain SpO2 > 88%. Patient only began to desat after walking for 5 minutes. Patient stated \"I don't walk this much at home\", denied any breathing difficulties.    Patient has an oxygen concentrator at home for his current oxygen regimen (2 L NC @ night) and is comfortable with using the concentrator at 1 L NC for his oxygen needs at home. Due to the prolonged duration of ambulation required to cause desaturation (and the patient's statement about not ambulating so much at home), RT believes patient is safe to be discharged at this time.    Spoke with Dr. Diallo and relayed this information to her.     05/02/25 1700   Data Measured Before Walk   HR 86   O2 Saturation 94   O2 Device Room air   Iveth Dyspnea Scale 0   Iveth Fatigue Scale 0   Data Measured During the Walk   Heart Rate 95   O2 Saturation 88   Need Additional O2 Flow Rate Rows Yes   O2 Flow Rate (l/min) 1 l/min   O2 Saturation 92   Iveth Dyspnea Scale 0   Iveth Fatigue Scale 0.5   Data Measured Immediately After Walk   Did Patient Stop or Pause Before 6 Minutes No   Heart Rate 98   O2 Flow Rate (l/min) 1 l/min   O2 Saturation 93   Iveth Dyspnea Scale 0   Iveth Fatigue Scale 0.5   Data Measured 5 Minutes After Walk   Heart Rate 87   O2 Saturation 94   Comments On 1 L NC

## 2025-05-02 NOTE — ANESTHESIA PRE PROCEDURE
Department of Anesthesiology  Preprocedure Note       Name:  Van Cassidy Jr.   Age:  85 y.o.  :  1940                                          MRN:  [id number]         Date:  2025      Surgeon: Surgeon(s):  Francisco Tracy MD    Procedure: Procedure(s):  COLONOSCOPY DIAGNOSTIC    Medications prior to admission:   Prior to Admission medications    Medication Sig Start Date End Date Taking? Authorizing Provider   losartan-hydroCHLOROthiazide (HYZAAR) 100-12.5 MG per tablet Take 1 tablet by mouth daily 4/10/25  Yes Jake Cheung MD   amiodarone (CORDARONE) 200 MG tablet Take 1 tablet by mouth daily 3/24/25  Yes Chelle Diallo MD   albuterol sulfate HFA (PROVENTIL;VENTOLIN;PROAIR) 108 (90 Base) MCG/ACT inhaler Inhale 2 puffs into the lungs every 4 hours as needed for Wheezing 3/23/25  Yes Chelle Diallo MD   budesonide-formoterol (SYMBICORT) 160-4.5 MCG/ACT AERO Inhale 2 puffs into the lungs 2 times daily 3/23/25  Yes Chelle Diallo MD   furosemide (LASIX) 20 MG tablet Take 1 tablet by mouth daily for 7 days Start in 2 days and followup with PCP and Nephrology closely for labs 3/26/25 4/29/25 Yes Chelle Diallo MD   aspirin 81 MG chewable tablet Take 1 tablet by mouth daily   Yes Jake Cheung MD   pregabalin (LYRICA) 25 MG capsule Take 1 capsule by mouth 2 times daily.   Yes Jake Cheung MD   lidocaine (LIDODERM) 5 % Place 1 patch onto the skin daily 12 hours on, 12 hours off.   Yes Jake Cheung MD   ALPRAZolam (XANAX) 0.25 MG tablet Take 1 tablet by mouth nightly as needed for Sleep.   Yes Jake Cheung MD   atorvastatin (LIPITOR) 20 MG tablet Take 1 tablet by mouth daily ceived the following from [facility]: Outside name: atorvastatin (LIPITOR) 20 mg tablet 23  Yes Automatic Reconciliation, Ar   tamsulosin (FLOMAX) 0.4 MG capsule Take 1 capsule by mouth daily ceived the following from [facility]: Outside name: tamsulosin

## 2025-05-03 RX ORDER — AMIODARONE HYDROCHLORIDE 200 MG/1
200 TABLET ORAL DAILY
Qty: 30 TABLET | Refills: 1 | Status: SHIPPED | OUTPATIENT
Start: 2025-05-03

## 2025-05-03 RX ORDER — PANTOPRAZOLE SODIUM 40 MG/1
40 TABLET, DELAYED RELEASE ORAL
Qty: 30 TABLET | Refills: 0 | Status: SHIPPED | OUTPATIENT
Start: 2025-05-03 | End: 2025-06-02

## 2025-07-24 ENCOUNTER — HOSPITAL ENCOUNTER (OUTPATIENT)
Facility: HOSPITAL | Age: 85
Discharge: HOME | End: 2025-07-27
Payer: MEDICARE

## 2025-07-24 ENCOUNTER — TRANSCRIBE ORDERS (OUTPATIENT)
Facility: HOSPITAL | Age: 85
End: 2025-07-24

## 2025-07-24 DIAGNOSIS — D64.9 ANEMIA, UNSPECIFIED TYPE: ICD-10-CM

## 2025-07-24 DIAGNOSIS — D64.9 ANEMIA, UNSPECIFIED TYPE: Primary | ICD-10-CM

## 2025-07-24 PROCEDURE — 36415 COLL VENOUS BLD VENIPUNCTURE: CPT

## 2025-07-24 PROCEDURE — 86850 RBC ANTIBODY SCREEN: CPT

## 2025-07-24 PROCEDURE — 86923 COMPATIBILITY TEST ELECTRIC: CPT

## 2025-07-24 PROCEDURE — 86901 BLOOD TYPING SEROLOGIC RH(D): CPT

## 2025-07-24 PROCEDURE — 86900 BLOOD TYPING SEROLOGIC ABO: CPT

## 2025-07-25 ENCOUNTER — HOSPITAL ENCOUNTER (OUTPATIENT)
Facility: HOSPITAL | Age: 85
Setting detail: INFUSION SERIES
Discharge: HOME | End: 2025-07-25
Payer: MEDICARE

## 2025-07-25 VITALS
RESPIRATION RATE: 22 BRPM | HEART RATE: 63 BPM | SYSTOLIC BLOOD PRESSURE: 164 MMHG | DIASTOLIC BLOOD PRESSURE: 58 MMHG | TEMPERATURE: 97.5 F | OXYGEN SATURATION: 96 %

## 2025-07-25 PROCEDURE — 6360000002 HC RX W HCPCS: Performed by: INTERNAL MEDICINE

## 2025-07-25 PROCEDURE — 36430 TRANSFUSION BLD/BLD COMPNT: CPT

## 2025-07-25 PROCEDURE — P9016 RBC LEUKOCYTES REDUCED: HCPCS

## 2025-07-25 RX ORDER — SODIUM CHLORIDE 9 MG/ML
INJECTION, SOLUTION INTRAVENOUS PRN
Status: DISCONTINUED | OUTPATIENT
Start: 2025-07-25 | End: 2025-07-26 | Stop reason: HOSPADM

## 2025-07-25 RX ORDER — FUROSEMIDE 10 MG/ML
20 INJECTION INTRAMUSCULAR; INTRAVENOUS ONCE
Status: COMPLETED | OUTPATIENT
Start: 2025-07-25 | End: 2025-07-25

## 2025-07-25 RX ORDER — FUROSEMIDE 10 MG/ML
20 INJECTION INTRAMUSCULAR; INTRAVENOUS PRN
Status: DISCONTINUED | OUTPATIENT
Start: 2025-07-25 | End: 2025-07-26 | Stop reason: HOSPADM

## 2025-07-25 RX ADMIN — FUROSEMIDE 20 MG: 10 INJECTION, SOLUTION INTRAMUSCULAR; INTRAVENOUS at 14:05

## 2025-07-25 RX ADMIN — FUROSEMIDE 20 MG: 10 INJECTION, SOLUTION INTRAMUSCULAR; INTRAVENOUS at 12:01

## 2025-07-25 NOTE — PROGRESS NOTES
Pt to [facility] for blood transfusion x2. Orders received from MD office and scanned into pt chart.     0830-- Pt arrived at this time accompanied by spouse and niece. Pt wheeled onto unit and placed in hospital bed with no issues. VS obtained and consistent with patient baseline. Pt O2 at 88% on arrival and patient SOB. Spouse states patient has O2 at home for PRN use. 2L O2 via NC placed on patient. Pt O2 sats recovered immediately. Orders placed as transcribed from paper. Consent obtained. Medication and health history reviewed and updated. Beverage provided. Patient resting comfortably at this time with call bell within reach. No labs needed prior to transfusion as patient had labs drawn in office yesterday. Hemoglobin on chart within 24 hours. No pre meds ordered by provider.     0858-- 1st unit PRBCs initiated and verified with Jennifer High RN at this time. No sign of reaction noted. Pt resting comfortably in bed at this time.   0913-- 15 minute monitoring concluded at this time. Pt VSS and no complaints expressed. Family at bedside and call bell within reach.   1030-- Patient provided snack and beverage at this time.  1055-- Patient resting comfortably in bed at this time with no current needs expressed.   1130-- Pt declined lunch tray. Resting comfortably with no needs expressed.   1158-- 1st unit completed at this time. VS obtained. BP within administration parameters for Lasix in between units per MD order.   1201-- Lasix given at this time. Pt escorted to the restroom by staff and safely returned to bed. Second unit requested at this time.   1221-- 2nd unit initiated and verified with Jennifer High RN. No sign of reaction noted at this time.   1236-- 15 minute monitoring complete at this time. Pt BP increasing despite receiving Lasix in between units. Dr. Ramires's office called and orders received for repeat dose of Furosemide 20 mg IV ONCE if BP does not come down by 1330. Will continue to monitor pt BP.

## 2025-07-25 NOTE — PROGRESS NOTES
1535PM : Pt's blood complete. Pt declines to stay the post hour observation period. IV removed; cath tip intact. Pt ambulated to restroom. D/C paperwork reviewed. Pt placed in wheelchair and wheeled out. Pt stable at time of d/c.

## 2025-07-25 NOTE — PROGRESS NOTES
BP checked at 1330 and was 165/72. Repeat dose of Lasix given due to BP of 173/63 on second recheck. Pt resting comfortably in bed.

## 2025-07-26 LAB
ABO + RH BLD: NORMAL
BLD PROD TYP BPU: NORMAL
BLD PROD TYP BPU: NORMAL
BLOOD BANK BLOOD PRODUCT EXPIRATION DATE: NORMAL
BLOOD BANK BLOOD PRODUCT EXPIRATION DATE: NORMAL
BLOOD BANK DISPENSE STATUS: NORMAL
BLOOD BANK DISPENSE STATUS: NORMAL
BLOOD BANK ISBT PRODUCT BLOOD TYPE: 6200
BLOOD BANK ISBT PRODUCT BLOOD TYPE: 6200
BLOOD BANK PRODUCT CODE: NORMAL
BLOOD BANK PRODUCT CODE: NORMAL
BLOOD BANK UNIT TYPE AND RH: NORMAL
BLOOD BANK UNIT TYPE AND RH: NORMAL
BLOOD GROUP ANTIBODIES SERPL: NEGATIVE
BPU ID: NORMAL
BPU ID: NORMAL
CROSSMATCH RESULT: NORMAL
CROSSMATCH RESULT: NORMAL
SPECIMEN EXP DATE BLD: NORMAL
TRANSFUSION STATUS PATIENT QL: NORMAL
TRANSFUSION STATUS PATIENT QL: NORMAL
UNIT DIVISION: 0
UNIT DIVISION: 0
UNIT ISSUE DATE/TIME: NORMAL
UNIT ISSUE DATE/TIME: NORMAL

## (undated) DEVICE — TRAP SPEC POLYP REM STRNR CLN DSGN MAGNIFYING WIND DISP

## (undated) DEVICE — FORCEPS BX 240CM 2.4MM L NDL RAD JAW 4 M00513334

## (undated) DEVICE — KIT ENDOSCOPIC  PROC VIA

## (undated) DEVICE — SNARE ENDOSCP L240CM LOOP W13MM SHTH DIA2.4MM SM OVL FLX

## (undated) DEVICE — MASK ANES INF SZ 2 PREM TAIL VLV INFL PRT UNSCENTED SGL PT

## (undated) DEVICE — BITE BLOCK ENDOSCP AD 60 FR W/ ADJ STRP PLAS GRN BLOX

## (undated) DEVICE — LINE SAMPLING AD NSL O2 TBNG MICROSTREAM ADV FILTERLINE MVAO

## (undated) DEVICE — MASK O2 MED AD 7 FT 3 IN 1 W/ STD CONN LTX

## (undated) DEVICE — FORCEP BX JUMBO 2.8 MMX240 CM ORNG RADIAL JAW 4 M00513363

## (undated) DEVICE — ELECTRODE PT RET AD L9FT HI MOIST COND ADH HYDRGEL CORDED